# Patient Record
Sex: FEMALE | Race: WHITE | NOT HISPANIC OR LATINO | URBAN - METROPOLITAN AREA
[De-identification: names, ages, dates, MRNs, and addresses within clinical notes are randomized per-mention and may not be internally consistent; named-entity substitution may affect disease eponyms.]

---

## 2018-08-27 ENCOUNTER — OFFICE (AMBULATORY)
Dept: URBAN - METROPOLITAN AREA CLINIC 64 | Facility: CLINIC | Age: 58
End: 2018-08-27

## 2018-08-27 VITALS
WEIGHT: 182 LBS | HEIGHT: 61 IN | DIASTOLIC BLOOD PRESSURE: 79 MMHG | HEART RATE: 95 BPM | SYSTOLIC BLOOD PRESSURE: 139 MMHG

## 2018-08-27 DIAGNOSIS — R10.13 EPIGASTRIC PAIN: ICD-10-CM

## 2018-08-27 DIAGNOSIS — R14.0 ABDOMINAL DISTENSION (GASEOUS): ICD-10-CM

## 2018-08-27 PROCEDURE — 99213 OFFICE O/P EST LOW 20 MIN: CPT | Performed by: NURSE PRACTITIONER

## 2021-05-25 ENCOUNTER — OFFICE (AMBULATORY)
Dept: URBAN - METROPOLITAN AREA CLINIC 64 | Facility: CLINIC | Age: 61
End: 2021-05-25

## 2021-05-25 VITALS
HEART RATE: 86 BPM | HEIGHT: 61 IN | WEIGHT: 156 LBS | SYSTOLIC BLOOD PRESSURE: 150 MMHG | DIASTOLIC BLOOD PRESSURE: 77 MMHG

## 2021-05-25 DIAGNOSIS — R10.13 EPIGASTRIC PAIN: ICD-10-CM

## 2021-05-25 DIAGNOSIS — Z86.010 PERSONAL HISTORY OF COLONIC POLYPS: ICD-10-CM

## 2021-05-25 DIAGNOSIS — R19.4 CHANGE IN BOWEL HABIT: ICD-10-CM

## 2021-05-25 DIAGNOSIS — R14.0 ABDOMINAL DISTENSION (GASEOUS): ICD-10-CM

## 2021-05-25 PROCEDURE — 99214 OFFICE O/P EST MOD 30 MIN: CPT | Performed by: NURSE PRACTITIONER

## 2021-07-06 ENCOUNTER — ON CAMPUS - OUTPATIENT (AMBULATORY)
Dept: URBAN - METROPOLITAN AREA HOSPITAL 2 | Facility: HOSPITAL | Age: 61
End: 2021-07-06
Payer: MEDICARE

## 2021-07-06 VITALS
DIASTOLIC BLOOD PRESSURE: 83 MMHG | DIASTOLIC BLOOD PRESSURE: 94 MMHG | DIASTOLIC BLOOD PRESSURE: 66 MMHG | SYSTOLIC BLOOD PRESSURE: 158 MMHG | OXYGEN SATURATION: 97 % | SYSTOLIC BLOOD PRESSURE: 167 MMHG | SYSTOLIC BLOOD PRESSURE: 142 MMHG | RESPIRATION RATE: 18 BRPM | DIASTOLIC BLOOD PRESSURE: 71 MMHG | SYSTOLIC BLOOD PRESSURE: 127 MMHG | HEART RATE: 94 BPM | DIASTOLIC BLOOD PRESSURE: 88 MMHG | RESPIRATION RATE: 20 BRPM | HEART RATE: 96 BPM | HEART RATE: 95 BPM | DIASTOLIC BLOOD PRESSURE: 76 MMHG | OXYGEN SATURATION: 99 % | OXYGEN SATURATION: 96 % | HEART RATE: 82 BPM | HEART RATE: 75 BPM | OXYGEN SATURATION: 100 % | RESPIRATION RATE: 16 BRPM | WEIGHT: 148 LBS | OXYGEN SATURATION: 93 % | HEART RATE: 91 BPM | SYSTOLIC BLOOD PRESSURE: 141 MMHG | DIASTOLIC BLOOD PRESSURE: 69 MMHG | SYSTOLIC BLOOD PRESSURE: 166 MMHG | SYSTOLIC BLOOD PRESSURE: 156 MMHG | HEART RATE: 106 BPM | SYSTOLIC BLOOD PRESSURE: 132 MMHG | HEART RATE: 104 BPM | HEART RATE: 97 BPM | SYSTOLIC BLOOD PRESSURE: 122 MMHG | SYSTOLIC BLOOD PRESSURE: 113 MMHG | TEMPERATURE: 96.6 F | DIASTOLIC BLOOD PRESSURE: 34 MMHG | HEIGHT: 61 IN | DIASTOLIC BLOOD PRESSURE: 98 MMHG

## 2021-07-06 DIAGNOSIS — K62.1 RECTAL POLYP: ICD-10-CM

## 2021-07-06 DIAGNOSIS — K57.30 DIVERTICULOSIS OF LARGE INTESTINE WITHOUT PERFORATION OR ABS: ICD-10-CM

## 2021-07-06 DIAGNOSIS — D12.2 BENIGN NEOPLASM OF ASCENDING COLON: ICD-10-CM

## 2021-07-06 DIAGNOSIS — R19.4 CHANGE IN BOWEL HABIT: ICD-10-CM

## 2021-07-06 DIAGNOSIS — Z86.010 PERSONAL HISTORY OF COLONIC POLYPS: ICD-10-CM

## 2021-07-06 DIAGNOSIS — K56.609 UNSPECIFIED INTESTINAL OBSTRUCTION, UNSPECIFIED AS TO PARTIA: ICD-10-CM

## 2021-07-06 PROBLEM — D12.5 BENIGN NEOPLASM OF SIGMOID COLON: Status: ACTIVE | Noted: 2021-07-06

## 2021-07-06 PROBLEM — K63.5 POLYP OF COLON: Status: ACTIVE | Noted: 2021-07-06

## 2021-07-06 LAB
GI HISTOLOGY: A. UNSPECIFIED: (no result)
GI HISTOLOGY: B. UNSPECIFIED: (no result)
GI HISTOLOGY: C. UNSPECIFIED: (no result)
GI HISTOLOGY: D. UNSPECIFIED: (no result)
GI HISTOLOGY: PDF REPORT: (no result)

## 2021-07-06 PROCEDURE — 45385 COLONOSCOPY W/LESION REMOVAL: CPT | Mod: PT | Performed by: INTERNAL MEDICINE

## 2021-12-06 ENCOUNTER — TELEHEALTH PROVIDED OTHER THAN IN PATIENT'S HOME (AMBULATORY)
Dept: URBAN - METROPOLITAN AREA TELEHEALTH 4 | Facility: TELEHEALTH | Age: 61
End: 2021-12-06
Payer: MEDICARE

## 2021-12-06 VITALS — HEIGHT: 61 IN

## 2021-12-06 DIAGNOSIS — R14.0 ABDOMINAL DISTENSION (GASEOUS): ICD-10-CM

## 2021-12-06 DIAGNOSIS — R10.9 UNSPECIFIED ABDOMINAL PAIN: ICD-10-CM

## 2021-12-06 PROCEDURE — 99214 OFFICE O/P EST MOD 30 MIN: CPT | Mod: 95 | Performed by: NURSE PRACTITIONER

## 2021-12-06 RX ORDER — PANTOPRAZOLE SODIUM 40 MG/1
40 TABLET, DELAYED RELEASE ORAL
Qty: 90 | Refills: 3 | Status: COMPLETED
Start: 2021-12-06 | End: 2022-07-27

## 2022-07-27 ENCOUNTER — OFFICE (AMBULATORY)
Dept: URBAN - METROPOLITAN AREA CLINIC 64 | Facility: CLINIC | Age: 62
End: 2022-07-27

## 2022-07-27 VITALS
WEIGHT: 154 LBS | HEART RATE: 101 BPM | HEIGHT: 61 IN | SYSTOLIC BLOOD PRESSURE: 125 MMHG | DIASTOLIC BLOOD PRESSURE: 71 MMHG

## 2022-07-27 DIAGNOSIS — R14.0 ABDOMINAL DISTENSION (GASEOUS): ICD-10-CM

## 2022-07-27 DIAGNOSIS — R15.2 FECAL URGENCY: ICD-10-CM

## 2022-07-27 PROCEDURE — 99213 OFFICE O/P EST LOW 20 MIN: CPT | Performed by: NURSE PRACTITIONER

## 2022-10-03 ENCOUNTER — OFFICE (AMBULATORY)
Dept: URBAN - METROPOLITAN AREA CLINIC 64 | Facility: CLINIC | Age: 62
End: 2022-10-03

## 2022-10-03 VITALS
HEART RATE: 97 BPM | WEIGHT: 156 LBS | HEIGHT: 61 IN | DIASTOLIC BLOOD PRESSURE: 83 MMHG | SYSTOLIC BLOOD PRESSURE: 160 MMHG

## 2022-10-03 DIAGNOSIS — R15.2 FECAL URGENCY: ICD-10-CM

## 2022-10-03 DIAGNOSIS — R19.4 CHANGE IN BOWEL HABIT: ICD-10-CM

## 2022-10-03 DIAGNOSIS — R14.0 ABDOMINAL DISTENSION (GASEOUS): ICD-10-CM

## 2022-10-03 PROCEDURE — 99213 OFFICE O/P EST LOW 20 MIN: CPT | Performed by: NURSE PRACTITIONER

## 2023-03-30 ENCOUNTER — OFFICE (AMBULATORY)
Dept: URBAN - METROPOLITAN AREA PATHOLOGY 4 | Facility: PATHOLOGY | Age: 63
End: 2023-03-30

## 2023-03-30 ENCOUNTER — ON CAMPUS - OUTPATIENT (AMBULATORY)
Dept: URBAN - METROPOLITAN AREA HOSPITAL 2 | Facility: HOSPITAL | Age: 63
End: 2023-03-30

## 2023-03-30 VITALS
HEART RATE: 100 BPM | TEMPERATURE: 98.4 F | SYSTOLIC BLOOD PRESSURE: 138 MMHG | OXYGEN SATURATION: 93 % | HEART RATE: 99 BPM | OXYGEN SATURATION: 81 % | DIASTOLIC BLOOD PRESSURE: 64 MMHG | DIASTOLIC BLOOD PRESSURE: 73 MMHG | HEART RATE: 104 BPM | WEIGHT: 160 LBS | OXYGEN SATURATION: 92 % | SYSTOLIC BLOOD PRESSURE: 131 MMHG | SYSTOLIC BLOOD PRESSURE: 152 MMHG | DIASTOLIC BLOOD PRESSURE: 68 MMHG | DIASTOLIC BLOOD PRESSURE: 75 MMHG | HEART RATE: 110 BPM | RESPIRATION RATE: 16 BRPM | DIASTOLIC BLOOD PRESSURE: 76 MMHG | HEART RATE: 102 BPM | RESPIRATION RATE: 19 BRPM | DIASTOLIC BLOOD PRESSURE: 69 MMHG | SYSTOLIC BLOOD PRESSURE: 134 MMHG | OXYGEN SATURATION: 99 % | HEART RATE: 93 BPM | OXYGEN SATURATION: 97 % | SYSTOLIC BLOOD PRESSURE: 137 MMHG

## 2023-03-30 DIAGNOSIS — R10.13 EPIGASTRIC PAIN: ICD-10-CM

## 2023-03-30 DIAGNOSIS — R13.10 DYSPHAGIA, UNSPECIFIED: ICD-10-CM

## 2023-03-30 DIAGNOSIS — B37.81 CANDIDAL ESOPHAGITIS: ICD-10-CM

## 2023-03-30 DIAGNOSIS — K31.89 OTHER DISEASES OF STOMACH AND DUODENUM: ICD-10-CM

## 2023-03-30 DIAGNOSIS — K44.9 DIAPHRAGMATIC HERNIA WITHOUT OBSTRUCTION OR GANGRENE: ICD-10-CM

## 2023-03-30 LAB
GI HISTOLOGY: A. SELECT: (no result)
GI HISTOLOGY: PDF REPORT: (no result)

## 2023-03-30 PROCEDURE — 88305 TISSUE EXAM BY PATHOLOGIST: CPT | Mod: 26 | Performed by: INTERNAL MEDICINE

## 2023-03-30 PROCEDURE — 43239 EGD BIOPSY SINGLE/MULTIPLE: CPT | Performed by: INTERNAL MEDICINE

## 2023-03-30 PROCEDURE — 43450 DILATE ESOPHAGUS 1/MULT PASS: CPT | Performed by: INTERNAL MEDICINE

## 2023-03-30 RX ORDER — FLUCONAZOLE 100 MG/1
200 TABLET ORAL
Qty: 20 | Refills: 1 | Status: ACTIVE
Start: 2023-03-30

## 2023-03-30 RX ADMIN — IPRATROPIUM BROMIDE AND ALBUTEROL SULFATE: .5; 3 SOLUTION RESPIRATORY (INHALATION) at 14:52

## 2023-06-13 ENCOUNTER — OFFICE VISIT (OUTPATIENT)
Dept: FAMILY MEDICINE CLINIC | Facility: CLINIC | Age: 63
End: 2023-06-13
Payer: MEDICARE

## 2023-06-13 VITALS
DIASTOLIC BLOOD PRESSURE: 70 MMHG | TEMPERATURE: 98.2 F | WEIGHT: 159 LBS | HEIGHT: 60 IN | SYSTOLIC BLOOD PRESSURE: 150 MMHG | HEART RATE: 97 BPM | OXYGEN SATURATION: 93 % | BODY MASS INDEX: 31.22 KG/M2

## 2023-06-13 DIAGNOSIS — I10 ESSENTIAL (PRIMARY) HYPERTENSION: ICD-10-CM

## 2023-06-13 DIAGNOSIS — F41.9 ANXIETY: ICD-10-CM

## 2023-06-13 DIAGNOSIS — I10 PRIMARY HYPERTENSION: ICD-10-CM

## 2023-06-13 DIAGNOSIS — K21.9 GASTRO-ESOPHAGEAL REFLUX DISEASE WITHOUT ESOPHAGITIS: ICD-10-CM

## 2023-06-13 DIAGNOSIS — Z76.89 ESTABLISHING CARE WITH NEW DOCTOR, ENCOUNTER FOR: Primary | ICD-10-CM

## 2023-06-13 DIAGNOSIS — J43.9 PULMONARY EMPHYSEMA, UNSPECIFIED EMPHYSEMA TYPE: ICD-10-CM

## 2023-06-13 DIAGNOSIS — R22.42 NODULE OF SKIN OF LEFT FOOT: ICD-10-CM

## 2023-06-13 RX ORDER — FLUTICASONE FUROATE, UMECLIDINIUM BROMIDE AND VILANTEROL TRIFENATATE 100; 62.5; 25 UG/1; UG/1; UG/1
1 POWDER RESPIRATORY (INHALATION) DAILY
COMMUNITY
Start: 2023-06-05 | End: 2023-06-13

## 2023-06-13 RX ORDER — LISINOPRIL 10 MG/1
TABLET ORAL
Qty: 90 TABLET | Refills: 0 | Status: SHIPPED | OUTPATIENT
Start: 2023-06-13

## 2023-06-13 RX ORDER — AZELASTINE 1 MG/ML
SPRAY, METERED NASAL
Qty: 30 ML | Refills: 11 | Status: SHIPPED | OUTPATIENT
Start: 2023-06-13

## 2023-06-13 RX ORDER — ERGOCALCIFEROL 1.25 MG/1
CAPSULE ORAL
Qty: 4 CAPSULE | Refills: 6 | Status: SHIPPED | OUTPATIENT
Start: 2023-06-13

## 2023-06-13 RX ORDER — ALPRAZOLAM 0.5 MG/1
1 TABLET ORAL DAILY
COMMUNITY
Start: 2023-05-22

## 2023-06-13 RX ORDER — SIMETHICONE 125 MG
TABLET,CHEWABLE ORAL
Qty: 120 TABLET | Refills: 11 | Status: SHIPPED | OUTPATIENT
Start: 2023-06-13

## 2023-06-13 RX ORDER — SIMETHICONE 125 MG
TABLET,CHEWABLE ORAL
COMMUNITY
Start: 2023-05-22 | End: 2023-06-13

## 2023-06-13 RX ORDER — ALBUTEROL SULFATE 90 UG/1
AEROSOL, METERED RESPIRATORY (INHALATION)
Qty: 8.5 G | Refills: 5 | Status: SHIPPED | OUTPATIENT
Start: 2023-06-13

## 2023-06-13 RX ORDER — PANTOPRAZOLE SODIUM 40 MG/1
40 TABLET, DELAYED RELEASE ORAL EVERY MORNING
COMMUNITY
Start: 2023-04-18

## 2023-06-13 RX ORDER — LISINOPRIL 10 MG/1
10 TABLET ORAL DAILY
COMMUNITY
End: 2023-06-13

## 2023-06-13 RX ORDER — ROFLUMILAST 500 UG/1
TABLET ORAL
Qty: 90 TABLET | Refills: 0 | Status: SHIPPED | OUTPATIENT
Start: 2023-06-13

## 2023-06-13 RX ORDER — FLUTICASONE FUROATE, UMECLIDINIUM BROMIDE AND VILANTEROL TRIFENATATE 100; 62.5; 25 UG/1; UG/1; UG/1
POWDER RESPIRATORY (INHALATION)
Qty: 60 EACH | Refills: 5 | Status: SHIPPED | OUTPATIENT
Start: 2023-06-13

## 2023-06-13 RX ORDER — THEOPHYLLINE 300 MG/1
TABLET, EXTENDED RELEASE ORAL
Qty: 30 TABLET | Refills: 1 | Status: SHIPPED | OUTPATIENT
Start: 2023-06-13

## 2023-06-13 RX ORDER — ALPRAZOLAM 0.5 MG/1
TABLET ORAL
Qty: 30 TABLET | Refills: 3 | OUTPATIENT
Start: 2023-06-13

## 2023-06-13 RX ORDER — ROFLUMILAST 500 UG/1
1 TABLET ORAL DAILY
COMMUNITY
Start: 2023-05-22 | End: 2023-06-13

## 2023-06-13 RX ORDER — THEOPHYLLINE 300 MG/1
TABLET, EXTENDED RELEASE ORAL
COMMUNITY
Start: 2023-06-05 | End: 2023-06-13

## 2023-06-13 RX ORDER — ERGOCALCIFEROL 1.25 MG/1
1 CAPSULE ORAL WEEKLY
COMMUNITY
Start: 2023-05-22 | End: 2023-06-13

## 2023-06-13 RX ORDER — ALBUTEROL SULFATE 90 UG/1
2 AEROSOL, METERED RESPIRATORY (INHALATION) EVERY 4 HOURS PRN
COMMUNITY
Start: 2023-04-18 | End: 2023-06-13

## 2023-06-13 NOTE — ASSESSMENT & PLAN NOTE
Could be soft tissue swelling, fatty tissue or most likely a ganglion on cyst midfoot.    Obtaining foot x-ray for further evaluation

## 2023-06-13 NOTE — ASSESSMENT & PLAN NOTE
COPD is  stable and without any flareups .  Discussed monitoring symptoms and use of quick-relief medications and contacting us early in the course of exacerbations.  Counseled to avoid exposure to cigarette smoke.  Continue current medications.  Referring to Dr. Foster with Dr. Flores's group as patient no longer wants to see Dr. Flores.

## 2023-06-13 NOTE — PROGRESS NOTES
"Chief Complaint  Foot Swelling    Subjective          Hattie Casey presents to [facility]      History of Present Illness    Yamilka is a 62-year-old female patient who presents today to establish care. Was formerly of [facility]. She established with [facility] provider. Also went to [facility]. She has left multiple offices as she has not been happy with the providers she has seen.     PMH of anxiety, arthritis, asthma, colon polyps, emphysema/COPD, gastritis, HTN, GERD. Marie in past as well as hysterectomy due to uterine and cervical cancer.     She is on oxygen concentrator nightly. She has been on this therapy for about two years now. She followed Dr Flores before but tells me she was with conflict. She was diagnosed with sleep apnea. She is asking for referral to another pulmonologist.     Blood pressure today is 166/82. Recheck 150/70. Patient takes lisinopril 10 mg daily.     COPD controlled with Trelegy Ellipta, theophylline 300 mg twice daily, Daliresp 500 mcg daily, albuterol as needed. She is still smoking. Smokes around 5 cigarettes daily.     Anxiety controlled with xanax 0.5 mg daily for anxiety associated with SOB. She has been on this therapy for several years now she tells me.  Last refill 5/23/2023.  Previous provider put 2 remaining refills on her according to inspect.    Left foot with nodule on anterior foot, painful to palpation and developed about a week ago. No knows trauma or injury.     Follows [facility], Dr. Garcia and had EGD completed last month. She was with a small hiatal hernia. Reports hx of multiple polyps and tells me last colonoscopy was a few years back.     11/2/2022 mammogram shows BI-RADS 2 benign scattered fibroglandular tissue      Objective     Vital Signs:   /70   Pulse 97   Temp 98.2 °F (36.8 °C) (Infrared)   Ht 152.4 cm (60\")   Wt 72.1 kg (159 lb)   SpO2 93%   BMI 31.05 kg/m²           Physical Exam  Vitals reviewed. "   Constitutional:       Appearance: She is well-developed.      Comments: Wearing a face mask     HENT:      Head: Normocephalic and atraumatic.      Nose: Nose normal.      Mouth/Throat:      Mouth: Mucous membranes are moist.      Pharynx: Oropharynx is clear.   Eyes:      Conjunctiva/sclera: Conjunctivae normal.      Pupils: Pupils are equal, round, and reactive to light.   Cardiovascular:      Rate and Rhythm: Normal rate and regular rhythm.      Pulses: Normal pulses.      Heart sounds: Normal heart sounds.   Pulmonary:      Effort: Pulmonary effort is normal. No respiratory distress.      Breath sounds: Normal breath sounds.   Musculoskeletal:         General: Normal range of motion.      Cervical back: Normal range of motion.      Left foot: Deformity present. No bunion.        Feet:    Feet:      Left foot:      Skin integrity: No ulcer, blister, skin breakdown, erythema, warmth, callus, dry skin or fissure.      Comments: Soft mass of left anterior foot.  Tender to palpation.  No erythema or wound noted  Skin:     General: Skin is warm and dry.      Findings: No rash.   Neurological:      Mental Status: She is alert and oriented to person, place, and time.   Psychiatric:         Behavior: Behavior normal.              Result Review :                                   Assessment and Plan      Diagnoses and all orders for this visit:    1. Establishing care with new doctor, encounter for (Primary)    2. Primary hypertension  Assessment & Plan:  Hypertension is  stable. .  Continue current treatment regimen.  Blood pressure will be reassessed at the next regular appointment.    Orders:  -     CBC & Differential  -     Comprehensive metabolic panel    3. Anxiety  Assessment & Plan:  Anxiety well controlled with once daily dosing of alprazolam 0.5 mg.  Patient does at times get short of breath which causes her anxiety to worsen further worsens her shortness of breath patient not due for refill at this  time      4. Pulmonary emphysema, unspecified emphysema type  Assessment & Plan:  COPD is  stable and without any flareups .  Discussed monitoring symptoms and use of quick-relief medications and contacting us early in the course of exacerbations.  Counseled to avoid exposure to cigarette smoke.  Continue current medications.  Referring to Dr. Foster with Dr. Flores's group as patient no longer wants to see Dr. Flores.        Orders:  -     Ambulatory Referral to Pulmonology    5. Nodule of skin of left foot  Assessment & Plan:  Could be soft tissue swelling, fatty tissue or most likely a ganglion on cyst midfoot.    Obtaining foot x-ray for further evaluation        Orders:  -     XR Foot 3+ View Left              Follow Up       Return in about 2 months (around 8/13/2023) for with SULTANA Blankenship.      Patient was given instructions and counseling regarding her condition or for health maintenance advice. Please see specific information pulled into the AVS if appropriate.     Xochitl Lorenzo, APRN6/13/202314:55 EDT  This note has been electronically signed

## 2023-06-13 NOTE — ASSESSMENT & PLAN NOTE
Hypertension is  stable. .  Continue current treatment regimen.  Blood pressure will be reassessed at the next regular appointment.

## 2023-06-13 NOTE — ASSESSMENT & PLAN NOTE
Anxiety well controlled with once daily dosing of alprazolam 0.5 mg.  Patient does at times get short of breath which causes her anxiety to worsen further worsens her shortness of breath patient not due for refill at this time

## 2023-06-14 LAB
ALBUMIN SERPL-MCNC: 4.2 G/DL (ref 3.8–4.8)
ALBUMIN/GLOB SERPL: 1.9 {RATIO} (ref 1.2–2.2)
ALP SERPL-CCNC: 108 IU/L (ref 44–121)
ALT SERPL-CCNC: 9 IU/L (ref 0–32)
AST SERPL-CCNC: 14 IU/L (ref 0–40)
BASOPHILS # BLD AUTO: 0.1 X10E3/UL (ref 0–0.2)
BASOPHILS NFR BLD AUTO: 1 %
BILIRUB SERPL-MCNC: 0.4 MG/DL (ref 0–1.2)
BUN SERPL-MCNC: 7 MG/DL (ref 8–27)
BUN/CREAT SERPL: 12 (ref 12–28)
CALCIUM SERPL-MCNC: 9.2 MG/DL (ref 8.7–10.3)
CHLORIDE SERPL-SCNC: 98 MMOL/L (ref 96–106)
CO2 SERPL-SCNC: 31 MMOL/L (ref 20–29)
CREAT SERPL-MCNC: 0.58 MG/DL (ref 0.57–1)
EGFRCR SERPLBLD CKD-EPI 2021: 101 ML/MIN/1.73
EOSINOPHIL # BLD AUTO: 0.3 X10E3/UL (ref 0–0.4)
EOSINOPHIL NFR BLD AUTO: 2 %
ERYTHROCYTE [DISTWIDTH] IN BLOOD BY AUTOMATED COUNT: 13 % (ref 11.7–15.4)
GLOBULIN SER CALC-MCNC: 2.2 G/DL (ref 1.5–4.5)
GLUCOSE SERPL-MCNC: 160 MG/DL (ref 70–99)
HCT VFR BLD AUTO: 40.4 % (ref 34–46.6)
HGB BLD-MCNC: 13.1 G/DL (ref 11.1–15.9)
IMM GRANULOCYTES # BLD AUTO: 0 X10E3/UL (ref 0–0.1)
IMM GRANULOCYTES NFR BLD AUTO: 0 %
LYMPHOCYTES # BLD AUTO: 2.1 X10E3/UL (ref 0.7–3.1)
LYMPHOCYTES NFR BLD AUTO: 20 %
MCH RBC QN AUTO: 29 PG (ref 26.6–33)
MCHC RBC AUTO-ENTMCNC: 32.4 G/DL (ref 31.5–35.7)
MCV RBC AUTO: 89 FL (ref 79–97)
MONOCYTES # BLD AUTO: 0.9 X10E3/UL (ref 0.1–0.9)
MONOCYTES NFR BLD AUTO: 8 %
NEUTROPHILS # BLD AUTO: 7.2 X10E3/UL (ref 1.4–7)
NEUTROPHILS NFR BLD AUTO: 69 %
PLATELET # BLD AUTO: 305 X10E3/UL (ref 150–450)
POTASSIUM SERPL-SCNC: 3.4 MMOL/L (ref 3.5–5.2)
PROT SERPL-MCNC: 6.4 G/DL (ref 6–8.5)
RBC # BLD AUTO: 4.52 X10E6/UL (ref 3.77–5.28)
SODIUM SERPL-SCNC: 146 MMOL/L (ref 134–144)
WBC # BLD AUTO: 10.4 X10E3/UL (ref 3.4–10.8)

## 2023-06-15 DIAGNOSIS — M77.52 BONE SPUR OF LEFT FOOT: Primary | ICD-10-CM

## 2023-06-15 NOTE — PROGRESS NOTES
labs have returned.  Potassium was slightly low.  This could be due to inadequate hydration.  She does have slightly increased neutrophils which is not uncommon in a smoker.

## 2023-06-15 NOTE — PROGRESS NOTES
Patient's foot is with arthritis as well as a bone spur.  Recommend patient to alternate heat and ice, anti-inflammatories such as ibuprofen should help with this.  No major concern or issue.

## 2023-06-21 ENCOUNTER — TELEPHONE (OUTPATIENT)
Dept: FAMILY MEDICINE CLINIC | Facility: CLINIC | Age: 63
End: 2023-06-21

## 2023-06-21 NOTE — TELEPHONE ENCOUNTER
ATTEMPTING TO CONTACT PT BACK, NO ANSWER, LMOM    HUB TO READ  PT DENIED MEDICATION DUE TO SHE HAS 2 REFILLS STILL AVAILABLE FROM PREVIOUS PRESCRIBER

## 2023-06-21 NOTE — TELEPHONE ENCOUNTER
Caller: Hattie Casey    Relationship: Self    Best call back number: [phone]*    What is the best time to reach you: ANY AFTERNOON    Who are you requesting to speak with (clinical staff, provider,  specific staff member): CLINICAL    What was the call regarding: PATIENT CALLING STATING THAT THE REFILL FOR ALPRAZolam (XANAX) 0.5 MG tablet  WAS DENIED, AND REQUEST A CALL BACK TO ADVISE WHY THIS MEDICATION WAS DENIED.    Is it okay if the provider responds through MyChart: EITHER iPositionHART OR TELEPHONE

## 2023-08-04 RX ORDER — THEOPHYLLINE 300 MG/1
TABLET, EXTENDED RELEASE ORAL
Qty: 30 TABLET | Refills: 1 | Status: SHIPPED | OUTPATIENT
Start: 2023-08-04

## 2023-08-15 ENCOUNTER — OFFICE VISIT (OUTPATIENT)
Dept: FAMILY MEDICINE CLINIC | Facility: CLINIC | Age: 63
End: 2023-08-15
Payer: MEDICARE

## 2023-08-15 VITALS
HEIGHT: 60 IN | SYSTOLIC BLOOD PRESSURE: 141 MMHG | WEIGHT: 161 LBS | BODY MASS INDEX: 31.61 KG/M2 | OXYGEN SATURATION: 88 % | TEMPERATURE: 97.7 F | DIASTOLIC BLOOD PRESSURE: 79 MMHG | HEART RATE: 84 BPM

## 2023-08-15 DIAGNOSIS — F41.9 ANXIETY: ICD-10-CM

## 2023-08-15 DIAGNOSIS — Z79.899 CONTROLLED SUBSTANCE AGREEMENT SIGNED: ICD-10-CM

## 2023-08-15 DIAGNOSIS — M70.62 TROCHANTERIC BURSITIS, LEFT HIP: Primary | ICD-10-CM

## 2023-08-15 DIAGNOSIS — J43.9 PULMONARY EMPHYSEMA, UNSPECIFIED EMPHYSEMA TYPE: Primary | ICD-10-CM

## 2023-08-15 DIAGNOSIS — Z79.899 ENCOUNTER FOR LONG TERM BENZODIAZEPINE THERAPY: ICD-10-CM

## 2023-08-15 DIAGNOSIS — M25.552 LEFT HIP PAIN: ICD-10-CM

## 2023-08-15 PROCEDURE — 3078F DIAST BP <80 MM HG: CPT | Performed by: NURSE PRACTITIONER

## 2023-08-15 PROCEDURE — 1159F MED LIST DOCD IN RCRD: CPT | Performed by: NURSE PRACTITIONER

## 2023-08-15 PROCEDURE — 3077F SYST BP >= 140 MM HG: CPT | Performed by: NURSE PRACTITIONER

## 2023-08-15 PROCEDURE — 99214 OFFICE O/P EST MOD 30 MIN: CPT | Performed by: NURSE PRACTITIONER

## 2023-08-15 PROCEDURE — 1160F RVW MEDS BY RX/DR IN RCRD: CPT | Performed by: NURSE PRACTITIONER

## 2023-08-15 RX ORDER — ALPRAZOLAM 0.25 MG/1
0.25 TABLET ORAL 2 TIMES DAILY PRN
Qty: 60 TABLET | Refills: 1 | Status: SHIPPED | OUTPATIENT
Start: 2023-08-15

## 2023-08-15 NOTE — PROGRESS NOTES
Please notify patient the hip x-ray did not show any arthritis.  Likely this is a bursitis as we discussed today at her visit.  I would recommend ibuprofen 600 to 800 mg 3 times daily with food for any pain or discomfort.  She may want to take an evening in the morning dose initially.  Other options include physical therapy if she would like to be evaluated for that.

## 2023-08-15 NOTE — PROGRESS NOTES
"Chief Complaint  Anxiety and Depression    Subjective          Hattie Casey presents to [facility]      History of Present Illness    Yamilka is a 63-year-old female patient who presents today for 2-month follow-up of anxiety and emphysema.    Has been with pulmonary issues for years.  She she is on an oxygen concentrator nightly. At last visit, she told me she did not want to see Dr. Flores anymore and wanted a referral to a different pulmonologist.  Referred her to Dr. Leal. Appt next week.  She reports her breathing has been doing okay.  She has been with some productive sputum lately but has been able to expectorate without any issue.  Lowest O2 sat has been about 88%. She does pursed lip breathing and continues to use her oxygen -both of which help her symptoms. She reports sat level as high as 94% at times.     She is prescribed a low-dose alprazolam 0.5 mg once daily due to her pulmonary issues which can trigger her anxiety, she is SOB.  Prescription was last filled on 7/21/2023. She does take once daily. She would like to lower dose and be able to take twice daily \"for bad breathing days\".     We did do an x-ray of the left foot at last visit which indicated degenerative changes without acute abnormalities.  Patient not having any complications at this time.    She does complain of new left hip pain in mornings and night time. Has been going on for \"sometime\" but gotten worse. She takes tylenol arthritis and it helps occasionally. She denies any injury or traumas.  She does often sleep on her left side.      Patient Active Problem List   Diagnosis    Establishing care with new doctor, encounter for    Primary hypertension    Anxiety    Nodule of skin of left foot    Pulmonary emphysema    Bone spur of left foot    Encounter for long term benzodiazepine therapy    Controlled substance agreement signed    Left hip pain               Objective     Vital Signs:   /79 " "(BP Location: Left arm, Patient Position: Sitting, Cuff Size: Adult)   Pulse 84   Temp 97.7 øF (36.5 øC) (Infrared)   Ht 152.4 cm (60\")   Wt 73 kg (161 lb)   SpO2 (!) 88%   BMI 31.44 kg/mý           Physical Exam  Vitals reviewed.   Constitutional:       Appearance: She is well-developed.      Comments:      HENT:      Head: Normocephalic and atraumatic.      Mouth/Throat:      Mouth: Mucous membranes are moist.      Pharynx: Oropharynx is clear.   Eyes:      Conjunctiva/sclera: Conjunctivae normal.   Cardiovascular:      Rate and Rhythm: Normal rate and regular rhythm.      Pulses: Normal pulses.      Heart sounds: Normal heart sounds.   Pulmonary:      Effort: Pulmonary effort is normal. No tachypnea, bradypnea, accessory muscle usage, prolonged expiration or respiratory distress.      Breath sounds: Examination of the right-upper field reveals decreased breath sounds. Examination of the left-upper field reveals decreased breath sounds. Examination of the right-middle field reveals decreased breath sounds. Examination of the left-middle field reveals decreased breath sounds. Examination of the right-lower field reveals decreased breath sounds. Examination of the left-lower field reveals decreased breath sounds. Decreased breath sounds present. No wheezing, rhonchi or rales.   Musculoskeletal:         General: Normal range of motion.      Cervical back: Normal range of motion.      Left hip: Tenderness and bony tenderness present. No crepitus.        Legs:       Comments: Left lateral hip with tenderness to palpation   Skin:     General: Skin is warm and dry.      Findings: No rash.   Neurological:      Mental Status: She is alert and oriented to person, place, and time.   Psychiatric:         Behavior: Behavior normal.              Result Review :                                   Assessment and Plan      Diagnoses and all orders for this visit:    1. Pulmonary emphysema, unspecified emphysema type " (Primary)  -     ALPRAZolam (Xanax) 0.25 MG tablet; Take 1 tablet by mouth 2 (Two) Times a Day As Needed for Anxiety.  Dispense: 60 tablet; Refill: 1    2. Anxiety  -     Urine Drug Screen - Urine, Clean Catch; Future  -     ALPRAZolam (Xanax) 0.25 MG tablet; Take 1 tablet by mouth 2 (Two) Times a Day As Needed for Anxiety.  Dispense: 60 tablet; Refill: 1    3. Encounter for long term benzodiazepine therapy  -     Urine Drug Screen - Urine, Clean Catch; Future    4. Controlled substance agreement signed    5. Left hip pain  -     XR Hip With or Without Pelvis 2 - 3 View Left      For patient's anxiety will decrease dose to 0.25 mg twice daily as needed for anxiety related to shortness of breath.  UDS and controlled contract obtained today.  She has been on this therapy long-term.    Left hip is likely a bursitis based on examination or could be an underlying arthritis.  Will obtain hip x-ray.    We will have patient follow-up in 2 months to do her annual wellness visit and see how she is doing with Dr. Leal as she will be newly established with hi office. Pulmonary issues are stable at this time.          Follow Up       Return in about 2 months (around 10/15/2023) for with SULTANA Blankenship.      Patient was given instructions and counseling regarding her condition or for health maintenance advice. Please see specific information pulled into the AVS if appropriate.     Xochitl Lorenzo, [id number] EDT  This note has been electronically signed    Answers submitted by the patient for this visit:  Other (Submitted on 8/10/2023)  Please describe your symptoms.: Check up. Pain in left leg,ears & eyes.  Have you had these symptoms before?: Yes  How long have you been having these symptoms?: Greater than 2 weeks  Please list any medications you are currently taking for this condition.: Leg- nothing , Ears-nothing, Eyes-nothing  Please describe any probable cause for these symptoms. : Leg maybe sciatic  nerve. , Ears & eyes sinuses and allergies  Primary Reason for Visit (Submitted on 8/10/2023)  What is the primary reason for your visit?: Other

## 2023-08-18 ENCOUNTER — TELEPHONE (OUTPATIENT)
Dept: FAMILY MEDICINE CLINIC | Facility: CLINIC | Age: 63
End: 2023-08-18
Payer: MEDICARE

## 2023-08-18 NOTE — TELEPHONE ENCOUNTER
Caller: Hattie Casey    Relationship: Self    Best call back number: [phone]    PATIENT IS REQUESTING A ROLLATOR WALKER, WITH THE SEAT, AND THE HANDLE BRAKES AND BASKET.      SHE WOULD LIKE THIS TO GO TO [facility] IN [place] P[facility].    PLEASE GIVE PATIENT A CALLBACK.

## 2023-08-18 NOTE — TELEPHONE ENCOUNTER
Patient must have an in office evaluation discussing her inability to walk etc. because insurance will request notes from the visit and we did not discuss this at her appointment this week.

## 2023-08-23 ENCOUNTER — TELEPHONE (OUTPATIENT)
Dept: FAMILY MEDICINE CLINIC | Facility: CLINIC | Age: 63
End: 2023-08-23
Payer: MEDICARE

## 2023-08-23 DIAGNOSIS — F41.9 ANXIETY: ICD-10-CM

## 2023-08-23 DIAGNOSIS — J43.9 PULMONARY EMPHYSEMA, UNSPECIFIED EMPHYSEMA TYPE: ICD-10-CM

## 2023-08-23 RX ORDER — ALPRAZOLAM 0.25 MG/1
0.25 TABLET ORAL 2 TIMES DAILY PRN
Qty: 60 TABLET | Refills: 1 | Status: CANCELLED | OUTPATIENT
Start: 2023-08-23

## 2023-08-24 RX ORDER — ALPRAZOLAM 0.5 MG/1
0.5 TABLET ORAL 2 TIMES DAILY PRN
Qty: 60 TABLET | Refills: 2 | Status: SHIPPED | OUTPATIENT
Start: 2023-08-24

## 2023-08-24 NOTE — TELEPHONE ENCOUNTER
I will send prescription change of 0.5 mg twice daily.  Please call the pharmacy and asked them to discontinue the refill for the 0.25 mg prescription thank you

## 2023-08-25 ENCOUNTER — TELEPHONE (OUTPATIENT)
Dept: FAMILY MEDICINE CLINIC | Facility: CLINIC | Age: 63
End: 2023-08-25

## 2023-08-25 ENCOUNTER — OFFICE VISIT (OUTPATIENT)
Dept: FAMILY MEDICINE CLINIC | Facility: CLINIC | Age: 63
End: 2023-08-25
Payer: MEDICARE

## 2023-08-25 VITALS
WEIGHT: 162 LBS | HEART RATE: 94 BPM | OXYGEN SATURATION: 87 % | DIASTOLIC BLOOD PRESSURE: 77 MMHG | HEIGHT: 60 IN | TEMPERATURE: 97.9 F | SYSTOLIC BLOOD PRESSURE: 148 MMHG | BODY MASS INDEX: 31.8 KG/M2

## 2023-08-25 DIAGNOSIS — Z74.09 MOBILITY IMPAIRED: ICD-10-CM

## 2023-08-25 DIAGNOSIS — R06.09 DOE (DYSPNEA ON EXERTION): ICD-10-CM

## 2023-08-25 DIAGNOSIS — Z91.81 AT RISK FOR FALLS: ICD-10-CM

## 2023-08-25 DIAGNOSIS — J43.9 PULMONARY EMPHYSEMA, UNSPECIFIED EMPHYSEMA TYPE: Primary | ICD-10-CM

## 2023-08-25 PROCEDURE — 1159F MED LIST DOCD IN RCRD: CPT | Performed by: NURSE PRACTITIONER

## 2023-08-25 PROCEDURE — 1160F RVW MEDS BY RX/DR IN RCRD: CPT | Performed by: NURSE PRACTITIONER

## 2023-08-25 PROCEDURE — 99214 OFFICE O/P EST MOD 30 MIN: CPT | Performed by: NURSE PRACTITIONER

## 2023-08-25 PROCEDURE — 3078F DIAST BP <80 MM HG: CPT | Performed by: NURSE PRACTITIONER

## 2023-08-25 PROCEDURE — 3077F SYST BP >= 140 MM HG: CPT | Performed by: NURSE PRACTITIONER

## 2023-08-25 RX ORDER — CALCIUM CARBONATE 160(400)MG
1 TABLET,CHEWABLE ORAL DAILY
Qty: 1 EACH | Refills: 0 | Status: SHIPPED | OUTPATIENT
Start: 2023-08-25

## 2023-08-25 RX ORDER — FLUTICASONE FUROATE, UMECLIDINIUM BROMIDE AND VILANTEROL TRIFENATATE 200; 62.5; 25 UG/1; UG/1; UG/1
1 POWDER RESPIRATORY (INHALATION) DAILY
Qty: 60 EACH | Refills: 5 | Status: SHIPPED | OUTPATIENT
Start: 2023-08-25

## 2023-08-25 NOTE — TELEPHONE ENCOUNTER
Pharmacy calling.  They cannot bill insurance for the rollator.  Pt request RX for rollater be sent to [facility].

## 2023-08-25 NOTE — PROGRESS NOTES
Chief Complaint  Shortness of Breath (Pt wants to see about getting rollator )    Subjective          Hattie Casey presents to [facility]      Shortness of Breath  This is a chronic problem. The current episode started more than 1 year ago. The problem occurs daily. The problem has been waxing and waning. Associated symptoms include abdominal pain, chest pain, coryza, ear pain, leg pain, rhinorrhea and sputum production. Pertinent negatives include no claudication, fever, headaches, hemoptysis, leg swelling, neck pain, orthopnea, PND, rash, sore throat, swollen glands, syncope, vomiting or wheezing. The symptoms are aggravated by emotional upset, animal exposure, odors, URIs, fumes, pollens, weather changes, eating and lying flat.     Hattie is a 63 year old female patient who presents today with mobility issues.    She is having more difficulty walking due to her progressing COPD. By the time she gets from kitchen to bathroom 25 feet away she has a hard time catching her breath.   When she gets out of car and walks into grocery store she has trouble getting to the electric grocery cart. She is often looking for a place to sit. If she cannot find a place to sit she becomes over exerted and is worse. She has tried to use her portable oxygen and it doesn't help her symptoms. The only thing that helps is to sit and rest. She borrowed a friend Alicja for a few days and she got good benefit from it and felt safe and was able to complete her errands without over exerting herself due to ability to rest. She has been on Trelogy for several year now but symptoms seems to continue with flare ups due to seasonal changes. She used to follow Dr. Flores but stopped going to him four years ago or so. She has a new pt appt with Dr. Leal at the end of this month.       Objective     Vital Signs:   /77 (BP Location: Left arm, Patient Position: Sitting, Cuff Size: Adult)   Pulse 94   Temp  "97.9 øF (36.6 øC) (Infrared)   Ht 152.4 cm (60\")   Wt 73.5 kg (162 lb)   SpO2 (!) 87%   BMI 31.64 kg/mý           Physical Exam  Vitals reviewed.   Constitutional:       Appearance: She is well-developed.      Comments:      HENT:      Head: Normocephalic and atraumatic.      Nose: Nose normal.      Mouth/Throat:      Mouth: Mucous membranes are moist.      Pharynx: Oropharynx is clear.   Eyes:      Conjunctiva/sclera: Conjunctivae normal.      Pupils: Pupils are equal, round, and reactive to light.   Cardiovascular:      Rate and Rhythm: Normal rate and regular rhythm.      Pulses: Normal pulses.      Heart sounds: Normal heart sounds. No murmur heard.  Pulmonary:      Effort: Pulmonary effort is normal. No tachypnea, bradypnea, accessory muscle usage, prolonged expiration, respiratory distress or retractions.      Breath sounds: Normal breath sounds. Examination of the right-upper field reveals decreased breath sounds. Examination of the left-upper field reveals decreased breath sounds. Examination of the right-middle field reveals decreased breath sounds. Examination of the left-middle field reveals decreased breath sounds. Examination of the right-lower field reveals decreased breath sounds. Examination of the left-lower field reveals decreased breath sounds. No decreased breath sounds, wheezing, rhonchi or rales.   Musculoskeletal:         General: Normal range of motion.      Cervical back: Normal range of motion.   Skin:     General: Skin is warm and dry.      Findings: No rash.   Neurological:      Mental Status: She is alert and oriented to person, place, and time.   Psychiatric:         Behavior: Behavior normal.              Result Review :                                   Assessment and Plan      Diagnoses and all orders for this visit:    1. Pulmonary emphysema, unspecified emphysema type (Primary)  -     Misc. Devices (Rollator Ultra-Light) misc; 1 each Daily.  Dispense: 1 each; Refill: 0  -     " Fluticasone-Umeclidin-Vilant (Trelegy Ellipta) 200-62.5-25 MCG/ACT aerosol powder ; Inhale 1 puff Daily.  Dispense: 60 each; Refill: 5    2. MILLS (dyspnea on exertion)  -     Misc. Devices (Rollator Ultra-Light) misc; 1 each Daily.  Dispense: 1 each; Refill: 0  -     Fluticasone-Umeclidin-Vilant (Trelegy Ellipta) 200-62.5-25 MCG/ACT aerosol powder ; Inhale 1 puff Daily.  Dispense: 60 each; Refill: 5    3. Mobility impaired  -     Misc. Devices (Rollator Ultra-Light) misc; 1 each Daily.  Dispense: 1 each; Refill: 0    4. At risk for falls  -     Misc. Devices (Rollator Ultra-Light) misc; 1 each Daily.  Dispense: 1 each; Refill: 0      Patient is here to see pulmonary Dr. Melgar she has an appointment at the end of the month on the 30th.  She is with progressive pulmonary issues and has a longstanding history of COPD.  Will up her Trelegy dose to 200-60 2.5-25 mcg/ACT in the meantime since she is still having flareups due to the weather and seasonal changes.  Patient still smokes cigarettes and has no desire to stop at this time.  I do think she would benefit from a rollator walking wheelchair due to her MILLS and progressive pulmonary issues.  Resting frequently helps patient to keep from overexerting and she wants to remain as active as possible for quality of life.  We will get that sent to [employer]    Discussed risk of smoking continuation.  Also discussed benefits of smoking cessation.  Patient verbalized understanding.              Follow Up       No follow-ups on file.      Patient was given instructions and counseling regarding her condition or for health maintenance advice. Please see specific information pulled into the AVS if appropriate.     Xochitl Lorenzo, APRN8/25/202311:06 EDT  This note has been electronically signed    Answers submitted by the patient for this visit:  Primary Reason for Visit (Submitted on 8/21/2023)  What is the primary reason for your visit?: Shortness of Breath

## 2023-08-28 ENCOUNTER — OFFICE VISIT (OUTPATIENT)
Dept: PULMONOLOGY | Facility: HOSPITAL | Age: 63
End: 2023-08-28
Payer: MEDICARE

## 2023-08-28 VITALS
HEART RATE: 84 BPM | DIASTOLIC BLOOD PRESSURE: 80 MMHG | WEIGHT: 163.4 LBS | RESPIRATION RATE: 12 BRPM | BODY MASS INDEX: 32.08 KG/M2 | OXYGEN SATURATION: 80 % | HEIGHT: 60 IN | SYSTOLIC BLOOD PRESSURE: 155 MMHG

## 2023-08-28 DIAGNOSIS — Z72.0 TOBACCO USE: ICD-10-CM

## 2023-08-28 DIAGNOSIS — G47.33 OSA (OBSTRUCTIVE SLEEP APNEA): ICD-10-CM

## 2023-08-28 DIAGNOSIS — F17.210 TOBACCO DEPENDENCE DUE TO CIGARETTES: ICD-10-CM

## 2023-08-28 DIAGNOSIS — J43.1 PANLOBULAR EMPHYSEMA: Primary | ICD-10-CM

## 2023-08-28 PROCEDURE — G0463 HOSPITAL OUTPT CLINIC VISIT: HCPCS

## 2023-08-28 NOTE — PROGRESS NOTES
SLEEP/PULMONARY  CLINIC NOTE      PATIENT IDENTIFICATION:  Name: Hattie Casey  Age: 63 y.o.  Sex: female  :  1960  MRN: [id number]    DATE OF CONSULTATION:  2023                     CHIEF COMPLAINT: Chronic obstructive airway disease    History of Present Illness:   Hattie Casey is a 63 y.o. female patient current smoker heavy smoking in the past, presented with severe chronic obstructive airway disease currently on treatment still coughing intermittently and shortness of breath dyspnea on exertion chest use oxygen on and off with exercise, she she was prescribed it for at night, no hemoptysis no change in her weight no fever no chills no dizziness no lightheadedness  Pt with still multiple wakening up at night with sleepiness fatigue and snoring, witnessed apnea, Hard  to get up in the morning. Daytime fatigue sleepiness loss of energy, [place] score of ( 11), patient was doing obstructive sleep apnea in the past and she was on the CPAP she did not use it for 3 years and she was feeling significantly better when she was using it      Review of Systems:   Constitutional: As above   Eyes: negative   ENT/oropharynx: negative   Cardiovascular: negative   Respiratory: As above   Gastrointestinal: negative   Genitourinary: negative   Neurological: negative   Musculoskeletal: negative   Integument/breast: negative   Endocrine: negative   Allergic/Immunologic: negative     Past Medical History:  Past Medical History:   Diagnosis Date    Allergic Not sure    test ran    Allergic rhinitis     Anemia Not sure    Partially anemic. Years ago    Anxiety     Arthritis Not sure    Take arthritis tylenol    Asthma Not sure    On records    Asthma, extrinsic     Cancer Not sure 95-96    Years ago.(uterus-cervix) Been cleared of it    Cataract Around 1 1/2 year's ago    Right eye    Cholelithiasis Not sure    Taken out. Around  ?    Chronic bronchitis     Colon polyp Not sure    Last year had 14 removed    COPD  (chronic obstructive pulmonary disease)     Depression Not sure years ago    It's been year's    Diverticulosis Not sure    On records    Emphysema of lung     GERD (gastroesophageal reflux disease) Not sure    On records    Hyperlipidemia     Hypertension     Inflammatory bowel disease Not sure    On my records    Irritable bowel syndrome Not sure    On records    Neuromuscular disorder Not sure    When they done gallbladder surgery I was told they cut my muscles up bad.    DHIRAJ (obstructive sleep apnea)     [facility]L O2 [facility]    Tuberculosis High school    [employer]. Never had it       Past Surgical History:  Past Surgical History:   Procedure Laterality Date    CHOLECYSTECTOMY  Not sure    Years ago    COLONOSCOPY  2022    Had 14 polyps. Removed    ENDOSCOPY  05/30/2023    HYSTERECTOMY  1978    After having my 3rd son.        Family History:  History reviewed. No pertinent family history.     Social History:   Social History     Tobacco Use    Smoking status: Every Day     Packs/day: 0.25     Years: 50.00     Pack years: 12.50     Types: Cigarettes     Start date: 1/10/1974     Passive exposure: Current    Smokeless tobacco: Not on file    Tobacco comments:     Not heavy. Half pack a day if that   Substance Use Topics    Alcohol use: Never        Allergies:  Allergies   Allergen Reactions    Penicillins Itching       Home Meds:  (Not in a hospital admission)      Objective:    Vitals Ranges:   Heart Rate:  [84] 84  Resp:  [12] 12  BP: (155)/(80) 155/80  Body mass index is 31.91 kg/mý.     Exam:  General Appearance:  WDWN    HEENT:   without obvious abnormality,  Conjunctiva/corneas clear,  Normal external ear canals, no drainage    Clear orsalmucosa,  Mallampati score 3    Neck:  Supple, symmetrical, trachea midline. No JVD.  Lungs:   Bilateral basal rhonchi bilaterally, respirations unlabored symmetrical wall movement.    Chest wall:  No tenderness or deformity.    Heart:  Regular rate and rhythm, S1 and S2  normal.  Extremities: Trace edema no clubbing or Cyanosis        Data Review:  All labs (24hrs): No results found for this or any previous visit (from the past 24 hour(s)).     Imaging:  XR Hip With or Without Pelvis 2 - 3 View Left  Narrative: XR HIP W OR WO PELVIS 2-3 VIEW LEFT    Date of Exam: 8/15/2023 8:40 AM EDT    Indication: pain radiating down lateral leg    Comparison: None available.    Findings:  Left hip is normally aligned. No fracture or dislocation. Proximal femur is intact. Included portions of the left hemipelvis are intact.  Impression: Impression:  Negative for acute osseous abnormality.    Normal left hip alignment.    Electronically Signed: Guillermo Julio MD    8/15/2023 3:25 PM EDT    Workstation ID: [id number]       ASSESSMENT:  Diagnoses and all orders for this visit:    Panlobular emphysema  -     6 Minute Walk Test; Future  -     Complete PFT - Pre & Post Bronchodilator; Future    DHIRAJ (obstructive sleep apnea)  -     Home Sleep Study; Future    Tobacco use  -      CT Chest Low Dose Cancer Screening WO; Future    Tobacco dependence due to cigarettes  -      CT Chest Low Dose Cancer Screening WO; Future        PLAN:  Get PFT and 6-minute walk O2 monitor  Bronchodilator inhaled corticosteroid    Education how to use inhalers    Encouraged to use incentive spirometer    Continue to exercise slowly as tolerated    Monitor for any change in the color of the sputum    Avoid any exposure to fumes, gas or any irritant    This is patient with symptoms of obstructive sleep apnea, NPSG study ASAP / split night study, Avoid supine avoid sedative meds in pm, weight loss, Avoid driving. Long discussion with patient about the physiology of DHIRAJ, and long term and short term   benefit of treating DHIRAJ     Education patient  About tips for  smoking cessation and risk factors and benefit, was to a discussion with the patient.     Follow-up 3 weeks    Chica Leal MD. D, ABSM.  8/28/2023  16:03 EDT   3

## 2023-09-15 DIAGNOSIS — I10 ESSENTIAL (PRIMARY) HYPERTENSION: ICD-10-CM

## 2023-09-15 RX ORDER — LISINOPRIL 10 MG/1
TABLET ORAL
Qty: 90 TABLET | Refills: 0 | Status: SHIPPED | OUTPATIENT
Start: 2023-09-15

## 2023-09-15 RX ORDER — ROFLUMILAST 500 UG/1
TABLET ORAL
Qty: 90 TABLET | Refills: 0 | Status: SHIPPED | OUTPATIENT
Start: 2023-09-15

## 2023-09-18 DIAGNOSIS — G47.33 OSA (OBSTRUCTIVE SLEEP APNEA): Primary | ICD-10-CM

## 2023-09-19 ENCOUNTER — TELEPHONE (OUTPATIENT)
Dept: FAMILY MEDICINE CLINIC | Facility: CLINIC | Age: 63
End: 2023-09-19
Payer: MEDICARE

## 2023-09-19 DIAGNOSIS — J43.1 PANLOBULAR EMPHYSEMA: Primary | ICD-10-CM

## 2023-09-19 RX ORDER — FLUTICASONE FUROATE, UMECLIDINIUM BROMIDE AND VILANTEROL TRIFENATATE 100; 62.5; 25 UG/1; UG/1; UG/1
1 POWDER RESPIRATORY (INHALATION)
Qty: 60 EACH | Refills: 2 | Status: SHIPPED | OUTPATIENT
Start: 2023-09-19

## 2023-09-19 NOTE — TELEPHONE ENCOUNTER
Caller: Hattie Casey    Relationship: Self    Best call back number: [phone]     What was the call regarding: THE PATIENT'S  Fluticasone-Umeclidin-Vilant (Trelegy Ellipta) 200-62.5-25 MCG/ACT aerosol powder   WAS RECENTLY RAISED FROM 100 . WHEN TAKING  DOSAGE, SHE FELT UNWELL, WITH SYMPTOMS CLOSE TO ALLERGIES OR SINUS INFECTIONS.    THE PATIENT WOULD LIKE A CALL TO DISCUSS. FOR NOW, SHE IS USING  DOSAGE.

## 2023-09-28 ENCOUNTER — OFFICE VISIT (OUTPATIENT)
Dept: FAMILY MEDICINE CLINIC | Facility: CLINIC | Age: 63
End: 2023-09-28
Payer: MEDICARE

## 2023-09-28 VITALS
SYSTOLIC BLOOD PRESSURE: 153 MMHG | BODY MASS INDEX: 31.8 KG/M2 | WEIGHT: 162 LBS | HEART RATE: 101 BPM | HEIGHT: 60 IN | OXYGEN SATURATION: 89 % | DIASTOLIC BLOOD PRESSURE: 76 MMHG | TEMPERATURE: 97.9 F

## 2023-09-28 DIAGNOSIS — Z86.39 HISTORY OF DIET-CONTROLLED DIABETES: ICD-10-CM

## 2023-09-28 DIAGNOSIS — J43.9 PULMONARY EMPHYSEMA, UNSPECIFIED EMPHYSEMA TYPE: ICD-10-CM

## 2023-09-28 DIAGNOSIS — Z86.69 HISTORY OF SLEEP APNEA: ICD-10-CM

## 2023-09-28 DIAGNOSIS — I10 PRIMARY HYPERTENSION: Primary | ICD-10-CM

## 2023-09-28 DIAGNOSIS — Z13.220 SCREENING CHOLESTEROL LEVEL: ICD-10-CM

## 2023-09-28 DIAGNOSIS — Z87.440 HISTORY OF UTI: ICD-10-CM

## 2023-09-28 LAB
BILIRUB BLD-MCNC: NEGATIVE MG/DL
CLARITY, POC: CLEAR
COLOR UR: YELLOW
EXPIRATION DATE: ABNORMAL
GLUCOSE UR STRIP-MCNC: NEGATIVE MG/DL
KETONES UR QL: NEGATIVE
LEUKOCYTE EST, POC: NEGATIVE
Lab: ABNORMAL
NITRITE UR-MCNC: NEGATIVE MG/ML
PH UR: 8 [PH] (ref 5–8)
PROT UR STRIP-MCNC: NEGATIVE MG/DL
RBC # UR STRIP: NEGATIVE /UL
SP GR UR: 1.01 (ref 1–1.03)
UROBILINOGEN UR QL: ABNORMAL

## 2023-09-28 NOTE — PROGRESS NOTES
"Chief Complaint  Hypertension    Subjective          Hattie Casey presents to [facility]    Objective     63-year-old female patient who presents today to follow-up on hypertension.    She stopped taking the lisinopril 10 mg several days ago.  She reports she took for 2 days but did not like how she felt after taking them.  Patient reports \"these pills looked different than the pill I used to get.\"Patient explains her pills are now white and round and they are previously oval and brown.  Sounds like pharmacy change  of these medications.  She brings with her pill bottle today for me to review.  Blood pressure in office today is 153/76.  Higher than we would like it to be but patient reports she is \"nervous today \".  She brings log of blood pressures in from home when taking the lisinopril her blood pressure was running 100-130 systolic over 60 and 70.  She took her blood pressure without medications and blood pressure was running in the 125-140/60 and 70 range.  She has had a few elevated blood pressures in the 150/70 range.  Yesterday at home her blood pressure ranged in the 130-150/60-70 range.  She is asymptomatic.  No headaches chest pain dizziness.    She tells me the theophyline is causing her to feel \"gassy\". She feels better when she doesn't take it and would like to cease treatment with this medication.    She did get her Rolator that I ordered for a few weeks back due to [place]. She is able to get around better and feels safer due to her underlying breathing issues of COPD, chronic.  She is able to rest when she is feeling exerted.  I did refer her to Dr. Leal with pulmonology.  She saw him on 8/28/23 and he advised sleep study due to apnea symptoms and past history of sleep apnea. She does not want to do a sleep study that was ordered for 10/12/23.  Patient asking me today to order her a portable oxygen machine.    Patient has history of UTI without " "symptoms.  She is wanting to get her urine checked today.  Denies dysuria, urgency, hesitancy, or pressure.  No changes in urine output color or smell.        Vital Signs:     /76 (BP Location: Left arm, Patient Position: Sitting, Cuff Size: Adult)   Pulse 101   Temp 97.9 °F (36.6 °C) (Infrared)   Ht 152.4 cm (60\")   Wt 73.5 kg (162 lb)   SpO2 (!) 89%   BMI 31.64 kg/m²         History of Present Illness      Patient Active Problem List   Diagnosis    Establishing care with new doctor, encounter for    Primary hypertension    Anxiety    Nodule of skin of left foot    Pulmonary emphysema    Bone spur of left foot    Encounter for long term benzodiazepine therapy    Controlled substance agreement signed    Left hip pain    Trochanteric bursitis, left hip    DHIRAJ (obstructive sleep apnea)    History of UTI    History of sleep apnea    History of diet-controlled diabetes         Past Medical History:   Diagnosis Date    Allergic Not sure    test ran    Allergic rhinitis     Anemia Not sure    Partially anemic. Years ago    Anxiety     Arthritis Not sure    Take arthritis tylenol    Asthma Not sure    On records    Asthma, extrinsic     Cancer Not sure 95-96    Years ago.(uterus-cervix) Been cleared of it    Cataract Around 1 1/2 year's ago    Right eye    Cholelithiasis Not sure    Taken out. Around 1990 ?    Chronic bronchitis     Colon polyp Not sure    Last year had 14 removed    COPD (chronic obstructive pulmonary disease)     Depression Not sure years ago    It's been year's    Diverticulosis Not sure    On records    Emphysema of lung     GERD (gastroesophageal reflux disease) Not sure    On records    Hyperlipidemia     Hypertension     Inflammatory bowel disease Not sure    On my records    Irritable bowel syndrome Not sure    On records    Neuromuscular disorder Not sure    When they done gallbladder surgery I was told they cut my muscles up bad.    DHIRAJ (obstructive sleep apnea)     [facility]L O[facility]    " Tuberculosis [employer] school    [employer]. Never had it          History reviewed. No pertinent family history.       Past Surgical History:   Procedure Laterality Date    CHOLECYSTECTOMY  Not sure    Years ago    COLONOSCOPY  2022    Had 14 polyps. Removed    ENDOSCOPY  05/30/2023    HYSTERECTOMY  1978    After having my 3rd son.          Social History     Socioeconomic History    Marital status:    Tobacco Use    Smoking status: Every Day     Packs/day: 0.25     Years: 50.00     Pack years: 12.50     Types: Cigarettes     Start date: 1/10/1974     Passive exposure: Current    Tobacco comments:     Not heavy. Half pack a day if that   Vaping Use    Vaping Use: Former   Substance and Sexual Activity    Alcohol use: Never    Drug use: Never    Sexual activity: Not Currently     Partners: Male     Birth control/protection: None, Tubal ligation, Hysterectomy            Physical Exam  Vitals reviewed.   Constitutional:       Appearance: She is well-developed.      Comments:      HENT:      Head: Normocephalic and atraumatic.      Nose: Nose normal.      Mouth/Throat:      Mouth: Mucous membranes are moist.      Pharynx: Oropharynx is clear.   Eyes:      Conjunctiva/sclera: Conjunctivae normal.      Pupils: Pupils are equal, round, and reactive to light.   Cardiovascular:      Rate and Rhythm: Normal rate and regular rhythm.      Pulses: Normal pulses.      Heart sounds: Normal heart sounds.   Pulmonary:      Effort: Pulmonary effort is normal. No tachypnea, bradypnea, accessory muscle usage, prolonged expiration, respiratory distress or retractions.      Breath sounds: Examination of the right-upper field reveals decreased breath sounds. Examination of the left-upper field reveals decreased breath sounds. Examination of the right-middle field reveals decreased breath sounds. Examination of the left-middle field reveals decreased breath sounds. Examination of the right-lower field reveals decreased breath sounds.  Examination of the left-lower field reveals decreased breath sounds. Decreased breath sounds present. No wheezing, rhonchi or rales.   Abdominal:      General: Bowel sounds are normal. There is no distension.      Palpations: Abdomen is soft. There is no mass.      Tenderness: There is no abdominal tenderness. There is no guarding or rebound.      Hernia: No hernia is present.   Musculoskeletal:         General: Normal range of motion.      Cervical back: Normal range of motion.   Skin:     General: Skin is warm and dry.      Findings: No rash.   Neurological:      Mental Status: She is alert and oriented to person, place, and time.   Psychiatric:         Behavior: Behavior normal.              Result Review :                                   Assessment and Plan      Diagnoses and all orders for this visit:    1. Primary hypertension (Primary)  -     Lipid Panel With LDL / HDL Ratio; Future  -     Comprehensive Metabolic Panel; Future  -     CBC (No Diff); Future    2. Pulmonary emphysema, unspecified emphysema type    3. History of sleep apnea    4. History of UTI  -     POC Urinalysis Dipstick, Automated    5. History of diet-controlled diabetes  -     Microalbumin / Creatinine Urine Ratio - Urine, Clean Catch  -     Hemoglobin A1c; Future    6. Screening cholesterol level  -     Lipid Panel With LDL / HDL Ratio; Future      Regarding patient's hypertension, she does not want to take the lisinopril due to the change in color and shape of the pill.  Her blood pressure seems to be doing okay at home.  Advised her to continue monitoring this and come back on 10/19/2023 at her next appointment with readings.  If we need to place her on a different antihypertensive we will.  She is asymptomatic overall.    Regarding pulmonary emphysema, spent a great deal of time educating patient why she needs to see the pulmonologist.  She wants to go off her theophylline which she has been on for several years.  She states she  "\"feels better\" when she is not on it and notices no difference in her breathing better or worse.  Advise she can go ahead and stop the medication and speak with pulmonology going forward.  She does currently take Trelegy Ellipta for her chronic COPD emphysema.  She does have a history of sleep apnea and Dr. Leal recommended sleep apnea testing but patient canceled that appointment at her follow-up with him.  I was able to discuss with her what sleep apnea is, the risk of not treating, why she needs to complete the testing and the importance of controlling sleep apnea if it is still underlying.  Regarding portable oxygen machine discussed with patient she needs to follow-up with pulmonology regarding this.    Regarding UTI history UA at today in office shows negative for UTI. Since she gave a urine today we will perform a urine microalbumin as well.    She will be back for her annual wellness visit on 10/19/2023 we will follow-up with care gaps then.       I spent 48 minutes caring for Hattie on this date of service. This time includes time spent by me in the following activities:reviewing tests, performing a medically appropriate examination and/or evaluation , counseling and educating the patient/family/caregiver, and documenting information in the medical record    Follow Up       No follow-ups on file.      Patient was given instructions and counseling regarding her condition or for health maintenance advice. Please see specific information pulled into the AVS if appropriate.     Xochitl Lorenzo, APRN9/28/202313:[id number] EDT  This note has been electronically signed    Answers submitted by the patient for this visit:  Primary Reason for Visit (Submitted on 9/25/2023)  What is the primary reason for your visit?: High Blood Pressure    "

## 2023-09-29 LAB
ALBUMIN/CREAT UR: <10 MG/G CREAT (ref 0–29)
CREAT UR-MCNC: 31.4 MG/DL
MICROALBUMIN UR-MCNC: <3 UG/ML

## 2023-10-12 DIAGNOSIS — J43.1 PANLOBULAR EMPHYSEMA: Primary | ICD-10-CM

## 2023-10-13 ENCOUNTER — PATIENT MESSAGE (OUTPATIENT)
Dept: PULMONOLOGY | Facility: HOSPITAL | Age: 63
End: 2023-10-13

## 2023-10-19 ENCOUNTER — OFFICE VISIT (OUTPATIENT)
Dept: FAMILY MEDICINE CLINIC | Facility: CLINIC | Age: 63
End: 2023-10-19
Payer: MEDICARE

## 2023-10-19 VITALS
SYSTOLIC BLOOD PRESSURE: 159 MMHG | DIASTOLIC BLOOD PRESSURE: 82 MMHG | WEIGHT: 165 LBS | TEMPERATURE: 97.3 F | HEIGHT: 60 IN | OXYGEN SATURATION: 91 % | HEART RATE: 93 BPM | BODY MASS INDEX: 32.39 KG/M2

## 2023-10-19 DIAGNOSIS — I10 PRIMARY HYPERTENSION: ICD-10-CM

## 2023-10-19 DIAGNOSIS — Z00.00 ENCOUNTER FOR ANNUAL WELLNESS VISIT (AWV) IN MEDICARE PATIENT: Primary | ICD-10-CM

## 2023-10-19 DIAGNOSIS — F41.9 ANXIETY: ICD-10-CM

## 2023-10-19 DIAGNOSIS — R05.8 PRODUCTIVE COUGH: ICD-10-CM

## 2023-10-19 PROCEDURE — 3077F SYST BP >= 140 MM HG: CPT | Performed by: NURSE PRACTITIONER

## 2023-10-19 PROCEDURE — 1159F MED LIST DOCD IN RCRD: CPT | Performed by: NURSE PRACTITIONER

## 2023-10-19 PROCEDURE — 1170F FXNL STATUS ASSESSED: CPT | Performed by: NURSE PRACTITIONER

## 2023-10-19 PROCEDURE — G0439 PPPS, SUBSEQ VISIT: HCPCS | Performed by: NURSE PRACTITIONER

## 2023-10-19 PROCEDURE — 1160F RVW MEDS BY RX/DR IN RCRD: CPT | Performed by: NURSE PRACTITIONER

## 2023-10-19 PROCEDURE — 3079F DIAST BP 80-89 MM HG: CPT | Performed by: NURSE PRACTITIONER

## 2023-10-19 RX ORDER — GUAIFENESIN 600 MG/1
1200 TABLET, EXTENDED RELEASE ORAL 2 TIMES DAILY
Qty: 60 TABLET | Refills: 0 | Status: SHIPPED | OUTPATIENT
Start: 2023-10-19 | End: 2023-10-19 | Stop reason: SDUPTHER

## 2023-10-19 RX ORDER — AMLODIPINE BESYLATE 10 MG/1
10 TABLET ORAL DAILY
Qty: 30 TABLET | Refills: 2 | Status: SHIPPED | OUTPATIENT
Start: 2023-10-19

## 2023-10-19 RX ORDER — GUAIFENESIN 600 MG/1
600 TABLET, EXTENDED RELEASE ORAL 2 TIMES DAILY
Qty: 60 TABLET | Refills: 0 | Status: SHIPPED | OUTPATIENT
Start: 2023-10-19

## 2023-10-19 NOTE — PROGRESS NOTES
The ABCs of the Annual Wellness Visit  Subsequent Medicare Wellness Visit    Subjective    Hattie Casey is a 63 y.o. female who presents for a Subsequent Medicare Wellness Visit.    The following portions of the patient's history were reviewed and   updated as appropriate: allergies, current medications, past family history, past medical history, past social history, past surgical history, and problem list.    Compared to one year ago, the patient feels her physical   health is better.    Compared to one year ago, the patient feels her mental   health is better.    Recent Hospitalizations:  She was not admitted to the hospital during the last year.       Current Medical Providers:  Patient Care Team:  Xochitl Lorenzo APRN as PCP - General (Nurse Practitioner)    Outpatient Medications Prior to Visit   Medication Sig Dispense Refill    albuterol sulfate  (90 Base) MCG/ACT inhaler INHALE 2 puffs BY MOUTH EVERY 4 HOURS AS NEEDED FOR shortness of breath 8.5 g 5    ALPRAZolam (Xanax) 0.5 MG tablet Take 1 tablet by mouth 2 (Two) Times a Day As Needed for Anxiety. 60 tablet 2    azelastine (ASTELIN) 0.1 % nasal spray INSTILL 2 SPRAYS in each nostril TWICE DAILY 30 mL 11    Daliresp 500 MCG tablet tablet TAKE ONE TABLET BY MOUTH DAILY 90 tablet 0    Fluticasone-Umeclidin-Vilant (Trelegy Ellipta) 100-62.5-25 MCG/ACT inhaler Inhale 1 puff Daily. 60 each 2    Misc. Devices (Rollator Ultra-Light) misc 1 each Daily. 1 each 0    pantoprazole (PROTONIX) 40 MG EC tablet Take 1 tablet by mouth Every Morning.      simethicone (MYLICON) 125 MG chewable tablet Chew 1 tablet after meals and at bedtime 120 tablet 11    vitamin D (ERGOCALCIFEROL) 1.25 MG (59069 UT) capsule capsule TAKE ONE CAPSULE BY MOUTH ONCE A WEEK 4 capsule 6     No facility-administered medications prior to visit.       No opioid medication identified on active medication list. I have reviewed chart for other potential  high risk medication/s and harmful  "drug interactions in the elderly.        Aspirin is not on active medication list.  Aspirin use is not indicated based on review of current medical condition/s. Risk of harm outweighs potential benefits.  .    Patient Active Problem List   Diagnosis    Establishing care with new doctor, encounter for    Primary hypertension    Anxiety    Nodule of skin of left foot    Pulmonary emphysema    Bone spur of left foot    Encounter for long term benzodiazepine therapy    Controlled substance agreement signed    Left hip pain    Trochanteric bursitis, left hip    DHIRAJ (obstructive sleep apnea)    History of UTI    History of sleep apnea    History of diet-controlled diabetes    Encounter for annual wellness visit (AWV) in Medicare patient     Advance Care Planning   Advance Care Planning     Advance Directive is not on file.  ACP discussion was held with the patient during this visit. Patient does not have an advance directive, information provided.     Objective    Vitals:    10/19/23 1459   BP: 159/82   BP Location: Left arm   Patient Position: Sitting   Cuff Size: Adult   Pulse: 93   Temp: 97.3 °F (36.3 °C)   TempSrc: Infrared   SpO2: 91%   Weight: 74.8 kg (165 lb)   Height: 152.4 cm (60\")     Estimated body mass index is 32.22 kg/m² as calculated from the following:    Height as of this encounter: 152.4 cm (60\").    Weight as of this encounter: 74.8 kg (165 lb).    BMI is >= 30 and <35. (Class 1 Obesity). The following options were offered after discussion;: exercise counseling/recommendations and nutrition counseling/recommendations      Does the patient have evidence of cognitive impairment? No          HEALTH RISK ASSESSMENT    Smoking Status:  Social History     Tobacco Use   Smoking Status Every Day    Packs/day: 0.25    Years: 50.00    Additional pack years: 0.00    Total pack years: 12.50    Types: Cigarettes    Start date: 1/10/1974    Passive exposure: Current   Smokeless Tobacco Not on file   Tobacco Comments "    Not heavy. Half pack a day if that     Alcohol Consumption:  Social History     Substance and Sexual Activity   Alcohol Use Never     Fall Risk Screen:    [place] Fall Risk Assessment was completed, and patient is at LOW risk for falls.Assessment completed on:8/15/2023    Depression Screening:      10/19/2023     3:00 PM   PHQ-2/PHQ-9 Depression Screening   Little Interest or Pleasure in Doing Things 1-->several days   Feeling Down, Depressed or Hopeless 1-->several days   Trouble Falling or Staying Asleep, or Sleeping Too Much 1-->several days   Feeling Tired or Having Little Energy 0-->not at all   Poor Appetite or Overeating 0-->not at all   Feeling Bad about Yourself - or that You are a Failure or Have Let Yourself or Your Family Down 1-->several days   Trouble Concentrating on Things, Such as Reading the Newspaper or Watching Television 0-->not at all   Moving or Speaking So Slowly that Other People Could Have Noticed? Or the Opposite - Being So Fidgety 0-->not at all   Thoughts that You Would be Better Off Dead or of Hurting Yourself in Some Way 0-->not at all   PHQ-9: Brief Depression Severity Measure Score 4       Health Habits and Functional and Cognitive Screening:      10/19/2023     3:00 PM   Functional & Cognitive Status   Do you have difficulty preparing food and eating? Yes   Do you have difficulty bathing yourself, getting dressed or grooming yourself? No   Do you have difficulty using the toilet? No   Do you have difficulty moving around from place to place? Yes   Do you have trouble with steps or getting out of a bed or a chair? Yes   Current Diet Well Balanced Diet   Dental Exam Other   Eye Exam Up to date   Exercise (times per week) 0 times per week   Current Exercises Include No Regular Exercise   Do you need help using the phone?  No   Are you deaf or do you have serious difficulty hearing?  No   Do you need help to go to places out of walking distance? Yes   Do you need help shopping? Yes    Do you need help preparing meals?  Yes   Do you need help with housework?  Yes   Do you need help with laundry? Yes   Do you need help taking your medications? No   Do you need help managing money? No   Do you ever drive or ride in a car without wearing a seat belt? No   Have you felt unusual stress, anger or loneliness in the last month? Yes   Who do you live with? Spouse   If you need help, do you have trouble finding someone available to you? No   Have you been bothered in the last four weeks by sexual problems? No   Do you have difficulty concentrating, remembering or making decisions? No       Age-appropriate Screening Schedule:  Refer to the list below for future screening recommendations based on patient's age, sex and/or medical conditions. Orders for these recommended tests are listed in the plan section. The patient has been provided with a written plan.    Health Maintenance   Topic Date Due    BMI FOLLOWUP  Never done    COLORECTAL CANCER SCREENING  Never done    TDAP/TD VACCINES (1 - Tdap) Never done    ZOSTER VACCINE (1 of 2) Never done    Pneumococcal Vaccine 65+ (2 - PCV) 08/21/2019    HEPATITIS C SCREENING  Never done    DIABETIC FOOT EXAM  Never done    HEMOGLOBIN A1C  03/14/2023    DIABETIC EYE EXAM  Never done    LIPID PANEL  Never done    INFLUENZA VACCINE  08/01/2023    URINE MICROALBUMIN  09/28/2024    ANNUAL WELLNESS VISIT  10/19/2024    MAMMOGRAM  11/02/2024    COVID-19 Vaccine  Discontinued    DXA SCAN  Discontinued                  CMS Preventative Services Quick Reference  Risk Factors Identified During Encounter  Immunizations Discussed/Encouraged: Td, Influenza, and Pneumococcal 23  The above risks/problems have been discussed with the patient.  Pertinent information has been shared with the patient in the After Visit Summary.  An After Visit Summary and PPPS were made available to the patient.    Follow Up:   Next Medicare Wellness visit to be scheduled in 1 year.       Additional  "E&M Note during same encounter follows:  Patient has multiple medical problems which are significant and separately identifiable that require additional work above and beyond the Medicare Wellness Visit.      Chief Complaint  Medicare Wellness-subsequent    Subjective        HPI  Hattie Casey is also being seen today for HTN.  Brings with her blood pressure logs.  This has been for the past 2 weeks.  Readings range anywhere in the 130-150 range over 60 and 70.  She stopped taking her lisinopril back in September because the pharmaceutical company change the color and shape of the pill.  Would like to put her on amlodipine    She is a smoker and is often with a cough. She has been smoking for years. At times has difficulty with expectorating the mucous. Review of Systems   Constitutional: Negative.    HENT: Negative.     Respiratory:  Positive for shortness of breath.         MILLS.  No SOB at rest   Cardiovascular: Negative.    Gastrointestinal: Negative.    Genitourinary: Negative.    Musculoskeletal: Negative.     When she feels \"full of mucus \"she gets anxiety because she has trouble breathing.  She is on 2 L oxygen today.  She has an appointment November 6 for portable oxygen .  She does take alprazolam 0.5 mg twice daily for breathing associated anxiety.  She has also worked on deep breathing techniques to help.        Objective   Vital Signs:  /82 (BP Location: Left arm, Patient Position: Sitting, Cuff Size: Adult)   Pulse 93   Temp 97.3 °F (36.3 °C) (Infrared)   Ht 152.4 cm (60\")   Wt 74.8 kg (165 lb)   SpO2 91%   BMI 32.22 kg/m²     Physical Exam  Vitals reviewed.   Constitutional:       Appearance: She is well-developed.      Comments:      HENT:      Head: Normocephalic and atraumatic.      Nose: Nose normal.      Mouth/Throat:      Mouth: Mucous membranes are moist.      Pharynx: Oropharynx is clear.   Eyes:      Conjunctiva/sclera: Conjunctivae normal.      Pupils: Pupils are " equal, round, and reactive to light.   Cardiovascular:      Rate and Rhythm: Normal rate and regular rhythm.      Pulses: Normal pulses.      Heart sounds: Normal heart sounds.   Pulmonary:      Effort: Pulmonary effort is normal. No tachypnea, bradypnea, accessory muscle usage, prolonged expiration or respiratory distress.      Breath sounds: No wheezing, rhonchi or rales.   Musculoskeletal:         General: Normal range of motion.      Cervical back: Normal range of motion.   Skin:     General: Skin is warm and dry.      Findings: No rash.   Neurological:      Mental Status: She is alert and oriented to person, place, and time.   Psychiatric:         Behavior: Behavior normal.                         Assessment and Plan   Diagnoses and all orders for this visit:    1. Encounter for annual wellness visit (AWV) in Medicare patient (Primary)    2. Primary hypertension  -     amLODIPine (NORVASC) 10 MG tablet; Take 1 tablet by mouth Daily.  Dispense: 30 tablet; Refill: 2    3. Anxiety    4. Productive cough  -     Discontinue: guaiFENesin (Mucinex) 600 MG 12 hr tablet; Take 2 tablets by mouth 2 (Two) Times a Day.  Dispense: 60 tablet; Refill: 0  -     guaiFENesin (Mucinex) 600 MG 12 hr tablet; Take 1 tablet by mouth 2 (Two) Times a Day.  Dispense: 60 tablet; Refill: 0          Last colonoscopy was earlier this year with Dr. Garcia - will obtain records.   Bonnie Shah for eye exam. Will request records.      Refuses Covid booster, pneumonia booster, flu vaccine.       The patient was counseled regarding nutrition, physical activity, healthy weight, injury prevention, misuse of tobacco, alcohol and illicit drugs, sexual behavior and STI's, contraception, dental health, mental health, immunizations, and screenings.         Follow Up   Return in about 3 months (around 1/19/2024) for with SULTANA Blankenship.  Patient was given instructions and counseling regarding her condition or for health maintenance advice.  Please see specific information pulled into the AVS if appropriate.

## 2023-10-24 DIAGNOSIS — F41.9 ANXIETY: ICD-10-CM

## 2023-10-24 DIAGNOSIS — J43.9 PULMONARY EMPHYSEMA, UNSPECIFIED EMPHYSEMA TYPE: ICD-10-CM

## 2023-10-24 RX ORDER — ALPRAZOLAM 0.5 MG/1
0.5 TABLET ORAL 2 TIMES DAILY PRN
Qty: 60 TABLET | Refills: 2 | Status: SHIPPED | OUTPATIENT
Start: 2023-10-24

## 2023-10-26 DIAGNOSIS — R73.03 PREDIABETES: Primary | ICD-10-CM

## 2023-10-26 RX ORDER — THEOPHYLLINE 300 MG/1
TABLET, EXTENDED RELEASE ORAL
Qty: 30 TABLET | Refills: 1 | OUTPATIENT
Start: 2023-10-26

## 2023-10-26 NOTE — TELEPHONE ENCOUNTER
Spoke with pt, she is aware and states called pulmonology already and has not heard back from them yet on it/

## 2023-11-06 ENCOUNTER — TELEPHONE (OUTPATIENT)
Dept: FAMILY MEDICINE CLINIC | Facility: CLINIC | Age: 63
End: 2023-11-06
Payer: MEDICARE

## 2023-11-06 NOTE — TELEPHONE ENCOUNTER
Spoke with pt, offered appt to see Xochitl tomorrow, pt states to not feel good today, pt advised to go to ER or urgent care, pt states if she gets any worse will do that then pt aware and verbally understands

## 2023-11-06 NOTE — TELEPHONE ENCOUNTER
Weak and no energy and feeling irritable, She thinks its her blood pressure medication. Please advise

## 2023-11-08 ENCOUNTER — TELEPHONE (OUTPATIENT)
Dept: FAMILY MEDICINE CLINIC | Facility: CLINIC | Age: 63
End: 2023-11-08
Payer: MEDICARE

## 2023-11-08 NOTE — TELEPHONE ENCOUNTER
PATIENT CALLED BACK EXPLAINING THAT SHE WILL CONTACT HER PULMONOLOGIST, DR. TALAVERA REGARDING THIS CONCERN AND TO DISREGARD THIS MESSAGE. THANK YOU!

## 2023-11-08 NOTE — TELEPHONE ENCOUNTER
Caller: Hattie Casey    Relationship: Self    Best call back number: [phone]    PATIENT CALLED AND HAD QUESTIONS FOR RONAK LORENZO ABOUT A PRESCRIPTION SHE USED TO TAKE.    THEOPHYLLINE.     PATIENT DISCONTINUED THIS A MONTH AGO BUT WONDERED ABOUT DUPIXENT AND IF THAT WOULD BE A GOOD ALTERNATIVE FOR HER.      IF IT WASN'T- SHE WONDERED IF RONAK WANTED HER TO START TAKING THEOPHYLLINE AGAIN?      PLEASE ADVISE

## 2023-11-10 ENCOUNTER — TELEPHONE (OUTPATIENT)
Dept: FAMILY MEDICINE CLINIC | Facility: CLINIC | Age: 63
End: 2023-11-10
Payer: MEDICARE

## 2023-11-10 NOTE — TELEPHONE ENCOUNTER
Spoke with patient and she is wanting to switch pulmonary Dr's, states wants to having testing done in [place] and not in [place], advised pt to contact office and have them send orders to [place] and that she could schedule there, called [place] and they advised Dr Nicole is not coming to [place] he only see's pt's in [place], Dr Flores and his NP Vanessa are only one's that see's pt's in [place].

## 2023-11-10 NOTE — TELEPHONE ENCOUNTER
Caller: Hattie Casey    Relationship: Self    Best call back number: [phone]     What is the medical concern/diagnosis: SLEEP APNEA    What specialty or service is being requested: SLEEP MEDICINE    What is the provider, practice or medical service name: DR. MARTINES    What is the office location: [place]    What is the office phone number:     Any additional details: PLEASE CALL AND ADVISE

## 2023-11-16 ENCOUNTER — TELEPHONE (OUTPATIENT)
Dept: FAMILY MEDICINE CLINIC | Facility: CLINIC | Age: 63
End: 2023-11-16
Payer: MEDICARE

## 2023-11-16 NOTE — TELEPHONE ENCOUNTER
Attempting to contact pt, no answer, left message to contact pulmonologist in regards to medication

## 2023-11-16 NOTE — TELEPHONE ENCOUNTER
Caller: Hattie Casey    Relationship: Self    Best call back number: [phone]     What medication are you requesting: ALTERNATIVE TO Daliresp 500 MCG tablet tablet    If a prescription is needed, what is your preferred pharmacy and phone number: [facility] - [place], [place] - [address] [place] [address] - [phone] [facility] [phone] FX     Additional notes: PATIENT IS REQUESTING AN ALTERNATIVE TO Daliresp 500 MCG tablet tablet  BECAUSE PATIENTS INSURANCE WILL NO LONGER COVER THE MEDICATION AND IT IS TOO EXPENSIVE     PLEASE ADVISE

## 2023-12-01 DIAGNOSIS — J43.1 PANLOBULAR EMPHYSEMA: ICD-10-CM

## 2023-12-04 RX ORDER — FLUTICASONE FUROATE, UMECLIDINIUM BROMIDE AND VILANTEROL TRIFENATATE 200; 62.5; 25 UG/1; UG/1; UG/1
POWDER RESPIRATORY (INHALATION)
COMMUNITY
Start: 2023-11-29

## 2023-12-04 RX ORDER — FLUTICASONE FUROATE, UMECLIDINIUM BROMIDE AND VILANTEROL TRIFENATATE 100; 62.5; 25 UG/1; UG/1; UG/1
1 POWDER RESPIRATORY (INHALATION) DAILY
Qty: 60 EACH | Refills: 2 | Status: SHIPPED | OUTPATIENT
Start: 2023-12-04 | End: 2023-12-04

## 2023-12-05 ENCOUNTER — TELEPHONE (OUTPATIENT)
Dept: PULMONOLOGY | Facility: HOSPITAL | Age: 63
End: 2023-12-05
Payer: MEDICARE

## 2023-12-05 NOTE — TELEPHONE ENCOUNTER
Pt called stated that she prefers to stay in [place] and switching back to Dr. Flores in the [place] clinic. She did not have any of the testing done that  had ordered.

## 2023-12-15 ENCOUNTER — TELEPHONE (OUTPATIENT)
Dept: FAMILY MEDICINE CLINIC | Facility: CLINIC | Age: 63
End: 2023-12-15
Payer: MEDICARE

## 2023-12-15 NOTE — TELEPHONE ENCOUNTER
Caller: Hattie Casey    Relationship: Self    Best call back number: [phone]     What medication are you requesting: ANTIBIOTIC     What are your current symptoms: CONGESTION, CLEAR MUCUS     How long have you been experiencing symptoms: 2-3 DAYS    If a prescription is needed, what is your preferred pharmacy and phone number: [facility] - [facility] [address] [place] [address] B - [phone]  - [phone] FX

## 2023-12-18 RX ORDER — ROFLUMILAST 500 UG/1
TABLET ORAL
Qty: 90 TABLET | Refills: 0 | Status: SHIPPED | OUTPATIENT
Start: 2023-12-18

## 2024-01-09 RX ORDER — ERGOCALCIFEROL 1.25 MG/1
CAPSULE ORAL
Qty: 4 CAPSULE | Refills: 6 | Status: SHIPPED | OUTPATIENT
Start: 2024-01-09

## 2024-01-22 DIAGNOSIS — F41.9 ANXIETY: ICD-10-CM

## 2024-01-22 DIAGNOSIS — J43.9 PULMONARY EMPHYSEMA, UNSPECIFIED EMPHYSEMA TYPE: ICD-10-CM

## 2024-01-22 RX ORDER — ALPRAZOLAM 0.5 MG/1
0.5 TABLET ORAL 2 TIMES DAILY PRN
Qty: 60 TABLET | Refills: 2 | Status: SHIPPED | OUTPATIENT
Start: 2024-01-22

## 2024-03-26 DIAGNOSIS — J43.9 PULMONARY EMPHYSEMA, UNSPECIFIED EMPHYSEMA TYPE: Primary | ICD-10-CM

## 2024-03-26 DIAGNOSIS — J43.1 PANLOBULAR EMPHYSEMA: ICD-10-CM

## 2024-04-19 DIAGNOSIS — F41.9 ANXIETY: ICD-10-CM

## 2024-04-19 DIAGNOSIS — J43.9 PULMONARY EMPHYSEMA, UNSPECIFIED EMPHYSEMA TYPE: ICD-10-CM

## 2024-04-19 RX ORDER — ALPRAZOLAM 0.5 MG/1
0.5 TABLET ORAL 2 TIMES DAILY PRN
Qty: 60 TABLET | Refills: 0 | Status: SHIPPED | OUTPATIENT
Start: 2024-04-19

## 2024-04-19 NOTE — TELEPHONE ENCOUNTER
Could you please call and discontinue the alprazolam prescription I just sent to pharmacy and then?

## 2024-07-01 DIAGNOSIS — K21.9 GASTRO-ESOPHAGEAL REFLUX DISEASE WITHOUT ESOPHAGITIS: ICD-10-CM

## 2024-07-01 RX ORDER — SIMETHICONE 125 MG
TABLET,CHEWABLE ORAL
Qty: 120 TABLET | Refills: 11 | OUTPATIENT
Start: 2024-07-01

## 2024-07-15 RX ORDER — ERGOCALCIFEROL 1.25 MG/1
CAPSULE ORAL
Qty: 4 CAPSULE | Refills: 6 | Status: SHIPPED | OUTPATIENT
Start: 2024-07-15

## 2025-01-24 NOTE — PROGRESS NOTES
Date of Office Visit: 2025  Encounter Provider: Dr. Osvaldo Kam  Place of Service: [facility] [place]  Patient Name: Hattie Casey  :1960  [place]    No chief complaint on file.    History of Present Illness:    I am pleased to see Mrs. Casey in my office today as a new consultation.    As you know, patient is a 64-year-old white female whose past medical history is significant for COPD, hypoxemia, home oxygen, who is referred to me for symptom of bilateral leg edema and shortness of breath:    Patient has advanced COPD.  She has baseline shortness of breath.  Patient is on home oxygen.  However recently she is getting more short of breath and noticed bilateral leg edema.  Patient complain of chest heaviness but denies any chest pain.  No orthopnea PND.  No syncope or presyncope.    Patient has quit smoking about a month ago.  Patient does not drink.  No previous history of CAD, PCI or MI.    EKG showed normal sinus rhythm.  Nonspecific ST changes noted.    At this stage, I would recommend that patient should proceed with echocardiogram to assess right and left heart function.  RV systolic pressure was measured.  Patient would need also stress test to rule out ischemia.        Past Medical History:   Diagnosis Date    Allergic Not sure    test ran    Allergic rhinitis     Anemia Not sure    Partially anemic. Years ago    Anxiety     Arthritis Not sure    Take arthritis tylenol    Asthma Not sure    On records    Asthma, extrinsic     Cancer Not sure 95-96    Years ago.(uterus-cervix) Been cleared of it    Cataract Around 1 1/2 year's ago    Right eye    Cholelithiasis Not sure    Taken out. Around  ?    Chronic bronchitis     Colon polyp Not sure    Last year had 14 removed    COPD (chronic obstructive pulmonary disease)     Depression Not sure years ago    It's been year's    Diverticulosis Not sure    On records    Emphysema of lung     GERD (gastroesophageal reflux  disease) Not sure    On records    Hyperlipidemia     Hypertension     Inflammatory bowel disease Not sure    On my records    Irritable bowel syndrome Not sure    On records    Neuromuscular disorder Not sure    When they done gallbladder surgery I was told they cut my muscles up bad.    DHIRAJ (obstructive sleep apnea)     [facility] 1L O2 [facility]    Tuberculosis [employer] school    [employer]. Never had it         Past Surgical History:   Procedure Laterality Date    CHOLECYSTECTOMY  Not sure    Years ago    COLONOSCOPY  2022    Had 14 polyps. Removed    ENDOSCOPY  05/30/2023    HYSTERECTOMY  1978    After having my 3rd son.           Current Outpatient Medications:     albuterol sulfate  (90 Base) MCG/ACT inhaler, INHALE 2 puffs BY MOUTH EVERY 4 HOURS AS NEEDED FOR shortness of breath, Disp: 8.5 g, Rfl: 5    ALPRAZolam (XANAX) 1 MG tablet, Take 1 tablet by mouth Every 12 (Twelve) Hours., Disp: , Rfl:     azelastine (ASTELIN) 0.1 % nasal spray, INSTILL 2 SPRAYS in each nostril TWICE DAILY, Disp: 30 mL, Rfl: 11    Daliresp 500 MCG tablet tablet, TAKE ONE TABLET BY MOUTH DAILY, Disp: 90 tablet, Rfl: 0    guaiFENesin (Mucinex) 600 MG 12 hr tablet, Take 1 tablet by mouth 2 (Two) Times a Day., Disp: 60 tablet, Rfl: 0    Misc. Devices (Rollator Ultra-Light) misc, 1 each Daily., Disp: 1 each, Rfl: 0    pantoprazole (PROTONIX) 40 MG EC tablet, Take 1 tablet by mouth Every Morning., Disp: , Rfl:     simethicone (MYLICON) 125 MG chewable tablet, Chew 1 tablet after meals and at bedtime, Disp: 120 tablet, Rfl: 11    theophylline (THEODUR) 300 MG 12 hr tablet, Take 0.5 tablets by mouth Every 12 (Twelve) Hours., Disp: , Rfl:     Trelegy Ellipta 200-62.5-25 MCG/ACT aerosol powder , , Disp: , Rfl:     vitamin D (ERGOCALCIFEROL) 1.25 MG (83903 UT) capsule capsule, TAKE ONE CAPSULE BY MOUTH ONCE A WEEK, Disp: 4 capsule, Rfl: 6      Social History     Socioeconomic History    Marital status:    Tobacco Use    Smoking status: Former      "Current packs/day: 0.00     Average packs/day: 0.3 packs/day for 51.0 years (12.8 ttl pk-yrs)     Types: Cigarettes     Start date: 1/10/1974     Quit date: 2025     Years since quittin.0     Passive exposure: Current    Smokeless tobacco: Never    Tobacco comments:     Vaping now plan on going off that later on down the road.   Vaping Use    Vaping status: Every Day    Substances: Nicotine    Devices: Disposable    Passive vaping exposure: Yes   Substance and Sexual Activity    Alcohol use: Never    Drug use: Never    Sexual activity: Not Currently     Partners: Male     Birth control/protection: None, Tubal ligation, Hysterectomy         Review of Systems   Constitutional: Negative for chills and fever.   HENT:  Negative for ear discharge and nosebleeds.    Eyes:  Negative for discharge and redness.   Cardiovascular:  Positive for leg swelling. Negative for chest pain, orthopnea, palpitations, paroxysmal nocturnal dyspnea and syncope.   Respiratory:  Positive for shortness of breath. Negative for cough and wheezing.    Endocrine: Negative for heat intolerance.   Skin:  Negative for rash.   Musculoskeletal:  Negative for arthritis and myalgias.   Gastrointestinal:  Negative for abdominal pain, melena, nausea and vomiting.   Genitourinary:  Negative for dysuria and hematuria.   Neurological:  Negative for dizziness, light-headedness, numbness and tremors.   Psychiatric/Behavioral:  Negative for depression. The patient is not nervous/anxious.        Procedures    Procedures    No orders to display           Objective:    /71 (BP Location: Left arm, Patient Position: Sitting, Cuff Size: Adult)   Pulse 99   Ht 152.4 cm (60\")   Wt 77.6 kg (171 lb)   SpO2 90%   BMI 33.40 kg/m²         Constitutional:       Appearance: Well-developed.   Eyes:      General: No scleral icterus.        Right eye: No discharge.   HENT:      Head: Normocephalic and atraumatic.   Neck:      Thyroid: No thyromegaly.      " Lymphadenopathy: No cervical adenopathy.   Pulmonary:      Effort: Pulmonary effort is normal. No respiratory distress.      Breath sounds: Normal breath sounds. No wheezing. No rales.   Cardiovascular:      Normal rate. Regular rhythm.      No gallop.    Edema:     Peripheral edema absent.   Abdominal:      Tenderness: There is no abdominal tenderness.   Skin:     Findings: No erythema or rash.   Neurological:      Mental Status: Alert and oriented to person, place, and time.             Assessment:       Diagnosis Plan   1. Chronic hypotension [I95.89]  Adult Transthoracic Echo Complete W/ Cont if Necessary Per Protocol    Stress Test With Myocardial Perfusion One Day      2. Bilateral leg edema  Adult Transthoracic Echo Complete W/ Cont if Necessary Per Protocol    Stress Test With Myocardial Perfusion One Day      3. Shortness of breath  Adult Transthoracic Echo Complete W/ Cont if Necessary Per Protocol    Stress Test With Myocardial Perfusion One Day      4. COPD mixed type  Adult Transthoracic Echo Complete W/ Cont if Necessary Per Protocol    Stress Test With Myocardial Perfusion One Day               Plan:       MDM:    1.  Bilateral leg edema:    Patient has leg edema.  It could be due to pulmonary hypertension.  I would proceed with echocardiogram to assess RV systolic pressure and RV function.  Patient leg edema has improved currently I would not start any diuretic    2.  Shortness of breath:    Stress test and echocardiogram will be done    3.  Hypertension:    Patient was on amlodipine because of low blood pressure which has been stopped.  Blood pressure at home are desirable continue to observe    4.  COPD:    Patient is on oxygen

## 2025-01-27 ENCOUNTER — OFFICE VISIT (OUTPATIENT)
Dept: CARDIOLOGY | Facility: CLINIC | Age: 65
End: 2025-01-27
Payer: MEDICARE

## 2025-01-27 VITALS
HEART RATE: 99 BPM | BODY MASS INDEX: 33.57 KG/M2 | DIASTOLIC BLOOD PRESSURE: 71 MMHG | OXYGEN SATURATION: 90 % | SYSTOLIC BLOOD PRESSURE: 153 MMHG | HEIGHT: 60 IN | WEIGHT: 171 LBS

## 2025-01-27 DIAGNOSIS — R06.02 SHORTNESS OF BREATH: ICD-10-CM

## 2025-01-27 DIAGNOSIS — I95.89 CHRONIC HYPOTENSION: Primary | ICD-10-CM

## 2025-01-27 DIAGNOSIS — R60.0 BILATERAL LEG EDEMA: ICD-10-CM

## 2025-01-27 DIAGNOSIS — J44.9 COPD MIXED TYPE: ICD-10-CM

## 2025-01-27 RX ORDER — ALPRAZOLAM 1 MG/1
1 TABLET ORAL EVERY 12 HOURS SCHEDULED
COMMUNITY
Start: 2025-01-15

## 2025-01-27 RX ORDER — THEOPHYLLINE 300 MG/1
0.5 TABLET, EXTENDED RELEASE ORAL EVERY 12 HOURS SCHEDULED
COMMUNITY
Start: 2025-01-15